# Patient Record
Sex: FEMALE | Race: WHITE | NOT HISPANIC OR LATINO | Employment: OTHER | URBAN - METROPOLITAN AREA
[De-identification: names, ages, dates, MRNs, and addresses within clinical notes are randomized per-mention and may not be internally consistent; named-entity substitution may affect disease eponyms.]

---

## 2018-05-18 ENCOUNTER — PROCEDURE VISIT CONVERTED (OUTPATIENT)
Dept: UROLOGY | Facility: CLINIC | Age: 68
End: 2018-05-18
Attending: UROLOGY

## 2019-05-10 ENCOUNTER — HOSPITAL ENCOUNTER (OUTPATIENT)
Dept: OTHER | Facility: HOSPITAL | Age: 69
Discharge: HOME | End: 2019-05-10
Attending: UROLOGY

## 2019-05-12 LAB — BACTERIA UR CULT: NORMAL

## 2019-05-17 ENCOUNTER — HOSPITAL ENCOUNTER (OUTPATIENT)
Dept: OTHER | Facility: HOSPITAL | Age: 69
Discharge: HOME | End: 2019-05-17
Attending: UROLOGY

## 2019-05-17 ENCOUNTER — PROCEDURE VISIT CONVERTED (OUTPATIENT)
Dept: UROLOGY | Facility: CLINIC | Age: 69
End: 2019-05-17
Attending: UROLOGY

## 2019-05-17 LAB
ANION GAP SERPL CALC-SCNC: 14 MMOL/L (ref 8–19)
BUN SERPL-MCNC: 9 MG/DL (ref 5–25)
BUN/CREAT SERPL: 13 {RATIO} (ref 6–20)
CALCIUM SERPL-MCNC: 9.5 MG/DL (ref 8.7–10.4)
CHLORIDE SERPL-SCNC: 99 MMOL/L (ref 99–111)
CONV CO2: 32 MMOL/L (ref 22–32)
CREAT UR-MCNC: 0.7 MG/DL (ref 0.5–0.9)
GFR SERPLBLD BASED ON 1.73 SQ M-ARVRAT: >60 ML/MIN/{1.73_M2}
GLUCOSE SERPL-MCNC: 94 MG/DL (ref 65–99)
OSMOLALITY SERPL CALC.SUM OF ELEC: 290 MOSM/KG (ref 273–304)
POTASSIUM SERPL-SCNC: 4.2 MMOL/L (ref 3.5–5.3)
SODIUM SERPL-SCNC: 141 MMOL/L (ref 135–147)

## 2019-06-05 ENCOUNTER — HOSPITAL ENCOUNTER (OUTPATIENT)
Dept: CT IMAGING | Facility: HOSPITAL | Age: 69
Discharge: HOME | End: 2019-06-05
Attending: UROLOGY

## 2019-06-21 ENCOUNTER — HOSPITAL ENCOUNTER (OUTPATIENT)
Dept: GENERAL RADIOLOGY | Facility: HOSPITAL | Age: 69
Discharge: HOME | End: 2019-06-21
Attending: UROLOGY

## 2019-06-24 ENCOUNTER — HOSPITAL ENCOUNTER (OUTPATIENT)
Dept: OTHER | Facility: HOSPITAL | Age: 69
Discharge: HOME | End: 2019-06-24
Attending: UROLOGY

## 2019-06-24 ENCOUNTER — OFFICE VISIT CONVERTED (OUTPATIENT)
Dept: UROLOGY | Facility: CLINIC | Age: 69
End: 2019-06-24
Attending: UROLOGY

## 2019-06-24 LAB
ANION GAP SERPL CALC-SCNC: 18 MMOL/L (ref 8–19)
BUN SERPL-MCNC: 9 MG/DL (ref 5–25)
BUN/CREAT SERPL: 12 {RATIO} (ref 6–20)
CALCIUM SERPL-MCNC: 9.4 MG/DL (ref 8.7–10.4)
CHLORIDE SERPL-SCNC: 100 MMOL/L (ref 99–111)
CONV CO2: 30 MMOL/L (ref 22–32)
CREAT UR-MCNC: 0.76 MG/DL (ref 0.5–0.9)
GFR SERPLBLD BASED ON 1.73 SQ M-ARVRAT: >60 ML/MIN/{1.73_M2}
GLUCOSE SERPL-MCNC: 78 MG/DL (ref 65–99)
OSMOLALITY SERPL CALC.SUM OF ELEC: 294 MOSM/KG (ref 273–304)
POTASSIUM SERPL-SCNC: 4.8 MMOL/L (ref 3.5–5.3)
SODIUM SERPL-SCNC: 143 MMOL/L (ref 135–147)

## 2019-07-02 ENCOUNTER — OFFICE VISIT CONVERTED (OUTPATIENT)
Dept: INTERNAL MEDICINE | Facility: CLINIC | Age: 69
End: 2019-07-02
Attending: INTERNAL MEDICINE

## 2019-07-02 ENCOUNTER — HOSPITAL ENCOUNTER (OUTPATIENT)
Dept: OTHER | Facility: HOSPITAL | Age: 69
Discharge: HOME | End: 2019-07-02
Attending: INTERNAL MEDICINE

## 2019-07-02 LAB
ALBUMIN SERPL-MCNC: 4.2 G/DL (ref 3.5–5)
ALBUMIN/GLOB SERPL: 1.4 {RATIO} (ref 1.4–2.6)
ALP SERPL-CCNC: 79 U/L (ref 43–160)
ALT SERPL-CCNC: 16 U/L (ref 10–40)
ANION GAP SERPL CALC-SCNC: 20 MMOL/L (ref 8–19)
AST SERPL-CCNC: 26 U/L (ref 15–50)
BASOPHILS # BLD AUTO: 0.04 10*3/UL (ref 0–0.2)
BASOPHILS NFR BLD AUTO: 0.9 % (ref 0–3)
BILIRUB SERPL-MCNC: 1.04 MG/DL (ref 0.2–1.3)
BNP SERPL-MCNC: 198 PG/ML (ref 0–900)
BUN SERPL-MCNC: 13 MG/DL (ref 5–25)
BUN/CREAT SERPL: 17 {RATIO} (ref 6–20)
CALCIUM SERPL-MCNC: 10.4 MG/DL (ref 8.7–10.4)
CHLORIDE SERPL-SCNC: 94 MMOL/L (ref 99–111)
CHOLEST SERPL-MCNC: 244 MG/DL (ref 107–200)
CHOLEST/HDLC SERPL: 1.7 {RATIO} (ref 3–6)
CONV ABS IMM GRAN: 0 10*3/UL (ref 0–0.2)
CONV CO2: 30 MMOL/L (ref 22–32)
CONV IMMATURE GRAN: 0 % (ref 0–1.8)
CONV TOTAL PROTEIN: 7.3 G/DL (ref 6.3–8.2)
CREAT UR-MCNC: 0.77 MG/DL (ref 0.5–0.9)
DEPRECATED RDW RBC AUTO: 56.9 FL (ref 36.4–46.3)
EOSINOPHIL # BLD AUTO: 0.03 10*3/UL (ref 0–0.7)
EOSINOPHIL # BLD AUTO: 0.7 % (ref 0–7)
ERYTHROCYTE [DISTWIDTH] IN BLOOD BY AUTOMATED COUNT: 13.5 % (ref 11.7–14.4)
GFR SERPLBLD BASED ON 1.73 SQ M-ARVRAT: >60 ML/MIN/{1.73_M2}
GLOBULIN UR ELPH-MCNC: 3.1 G/DL (ref 2–3.5)
GLUCOSE SERPL-MCNC: 97 MG/DL (ref 65–99)
HBA1C MFR BLD: 18.4 G/DL (ref 12–16)
HCT VFR BLD AUTO: 54.2 % (ref 37–47)
HDLC SERPL-MCNC: 142 MG/DL (ref 40–60)
LDLC SERPL CALC-MCNC: 89 MG/DL (ref 70–100)
LYMPHOCYTES # BLD AUTO: 1.37 10*3/UL (ref 1–5)
MCH RBC QN AUTO: 37.9 PG (ref 27–31)
MCHC RBC AUTO-ENTMCNC: 33.9 G/DL (ref 33–37)
MCV RBC AUTO: 111.5 FL (ref 81–99)
MONOCYTES # BLD AUTO: 0.5 10*3/UL (ref 0.2–1.2)
MONOCYTES NFR BLD AUTO: 10.8 % (ref 3–10)
NEUTROPHILS # BLD AUTO: 2.67 10*3/UL (ref 2–8)
NEUTROPHILS NFR BLD AUTO: 57.9 % (ref 30–85)
NRBC CBCN: 0 % (ref 0–0.7)
OSMOLALITY SERPL CALC.SUM OF ELEC: 288 MOSM/KG (ref 273–304)
PLATELET # BLD AUTO: 201 10*3/UL (ref 130–400)
PMV BLD AUTO: 11.8 FL (ref 9.4–12.3)
POTASSIUM SERPL-SCNC: 4.8 MMOL/L (ref 3.5–5.3)
RBC # BLD AUTO: 4.86 10*6/UL (ref 4.2–5.4)
SODIUM SERPL-SCNC: 139 MMOL/L (ref 135–147)
T4 FREE SERPL-MCNC: 1.4 NG/DL (ref 0.9–1.8)
TRIGL SERPL-MCNC: 64 MG/DL (ref 40–150)
TSH SERPL-ACNC: 2 M[IU]/L (ref 0.27–4.2)
VARIANT LYMPHS NFR BLD MANUAL: 29.7 % (ref 20–45)
VLDLC SERPL-MCNC: 13 MG/DL (ref 5–37)
WBC # BLD AUTO: 4.61 10*3/UL (ref 4.8–10.8)

## 2019-07-05 LAB — CONV ANTI NUCLEAR ANTIBODY WITH REFLEX: NEGATIVE

## 2019-07-23 ENCOUNTER — HOSPITAL ENCOUNTER (OUTPATIENT)
Dept: ULTRASOUND IMAGING | Facility: HOSPITAL | Age: 69
Discharge: HOME | End: 2019-07-23
Attending: UROLOGY

## 2019-07-26 ENCOUNTER — HOSPITAL ENCOUNTER (OUTPATIENT)
Dept: SURGERY | Facility: CLINIC | Age: 69
Discharge: HOME | End: 2019-07-26
Attending: UROLOGY

## 2019-07-26 ENCOUNTER — OFFICE VISIT CONVERTED (OUTPATIENT)
Dept: UROLOGY | Facility: CLINIC | Age: 69
End: 2019-07-26
Attending: UROLOGY

## 2019-07-28 LAB — BACTERIA UR CULT: NORMAL

## 2019-07-31 ENCOUNTER — CONVERSION ENCOUNTER (OUTPATIENT)
Dept: INTERNAL MEDICINE | Facility: CLINIC | Age: 69
End: 2019-07-31

## 2019-08-07 ENCOUNTER — HOSPITAL ENCOUNTER (OUTPATIENT)
Dept: CT IMAGING | Facility: HOSPITAL | Age: 69
Discharge: HOME | End: 2019-08-07
Attending: UROLOGY

## 2019-08-07 LAB
CREAT BLD-MCNC: 0.6 MG/DL (ref 0.6–1.4)
GFR SERPLBLD BASED ON 1.73 SQ M-ARVRAT: >60 ML/MIN/{1.73_M2}

## 2019-08-08 ENCOUNTER — HOSPITAL ENCOUNTER (OUTPATIENT)
Dept: PREADMISSION TESTING | Facility: HOSPITAL | Age: 69
Discharge: HOME | End: 2019-08-08
Attending: UROLOGY

## 2019-08-08 LAB
BASOPHILS # BLD AUTO: 0.05 10*3/UL (ref 0–0.2)
BASOPHILS NFR BLD AUTO: 1.1 % (ref 0–3)
CONV ABS IMM GRAN: 0 10*3/UL (ref 0–0.2)
CONV IMMATURE GRAN: 0 % (ref 0–1.8)
DEPRECATED RDW RBC AUTO: 52.1 FL (ref 36.4–46.3)
EOSINOPHIL # BLD AUTO: 0.05 10*3/UL (ref 0–0.7)
EOSINOPHIL # BLD AUTO: 1.1 % (ref 0–7)
ERYTHROCYTE [DISTWIDTH] IN BLOOD BY AUTOMATED COUNT: 12.8 % (ref 11.7–14.4)
HCT VFR BLD AUTO: 53.8 % (ref 37–47)
HGB BLD-MCNC: 18.1 G/DL (ref 12–16)
LYMPHOCYTES # BLD AUTO: 1.41 10*3/UL (ref 1–5)
LYMPHOCYTES NFR BLD AUTO: 31.5 % (ref 20–45)
MCH RBC QN AUTO: 36.6 PG (ref 27–31)
MCHC RBC AUTO-ENTMCNC: 33.6 G/DL (ref 33–37)
MCV RBC AUTO: 108.9 FL (ref 81–99)
MONOCYTES # BLD AUTO: 0.5 10*3/UL (ref 0.2–1.2)
MONOCYTES NFR BLD AUTO: 11.2 % (ref 3–10)
NEUTROPHILS # BLD AUTO: 2.46 10*3/UL (ref 2–8)
NEUTROPHILS NFR BLD AUTO: 55.1 % (ref 30–85)
NRBC CBCN: 0 % (ref 0–0.7)
PLATELET # BLD AUTO: 245 10*3/UL (ref 130–400)
PMV BLD AUTO: 10.8 FL (ref 9.4–12.3)
RBC # BLD AUTO: 4.94 10*6/UL (ref 4.2–5.4)
WBC # BLD AUTO: 4.47 10*3/UL (ref 4.8–10.8)

## 2019-08-13 ENCOUNTER — HOSPITAL ENCOUNTER (OUTPATIENT)
Dept: PERIOP | Facility: HOSPITAL | Age: 69
Setting detail: HOSPITAL OUTPATIENT SURGERY
Discharge: HOME | End: 2019-08-13
Attending: UROLOGY

## 2019-08-19 ENCOUNTER — OFFICE VISIT CONVERTED (OUTPATIENT)
Dept: INTERNAL MEDICINE | Facility: CLINIC | Age: 69
End: 2019-08-19
Attending: NURSE PRACTITIONER

## 2019-08-20 LAB
COLOR STONE: NORMAL
COMPN STONE: NORMAL
CONV CA OXALATE DIHYDRATE: 5 %
CONV CA OXALATE MONOHYDRATE: 92 %
CONV CALCIUM PHOSPHATE: 3 %
CONV CALCULI COMMENT: NORMAL
CONV CALCULI COMMENT: NORMAL
CONV CALCULI DISCLAIMER: NORMAL
CONV CALCULI NOTE: NORMAL
NIDUS STONE QL: NORMAL
SIZE STONE: NORMAL MM
WT STONE: 7.3 MG

## 2019-09-16 ENCOUNTER — HOSPITAL ENCOUNTER (OUTPATIENT)
Dept: ULTRASOUND IMAGING | Facility: HOSPITAL | Age: 69
Discharge: HOME | End: 2019-09-16
Attending: UROLOGY

## 2019-09-23 ENCOUNTER — OFFICE VISIT CONVERTED (OUTPATIENT)
Dept: UROLOGY | Facility: CLINIC | Age: 69
End: 2019-09-23
Attending: UROLOGY

## 2020-08-21 ENCOUNTER — PROCEDURE VISIT CONVERTED (OUTPATIENT)
Dept: UROLOGY | Facility: CLINIC | Age: 70
End: 2020-08-21
Attending: UROLOGY

## 2020-08-21 ENCOUNTER — HOSPITAL ENCOUNTER (OUTPATIENT)
Dept: SURGERY | Facility: CLINIC | Age: 70
Discharge: HOME | End: 2020-08-21
Attending: UROLOGY

## 2021-04-28 ENCOUNTER — OFFICE VISIT CONVERTED (OUTPATIENT)
Dept: INTERNAL MEDICINE | Facility: CLINIC | Age: 71
End: 2021-04-28
Attending: PHYSICIAN ASSISTANT

## 2021-04-28 ENCOUNTER — HOSPITAL ENCOUNTER (OUTPATIENT)
Dept: OTHER | Facility: HOSPITAL | Age: 71
Discharge: HOME | End: 2021-04-28
Attending: PHYSICIAN ASSISTANT

## 2021-04-28 LAB
ALBUMIN SERPL-MCNC: 4.1 G/DL (ref 3.5–5)
ALBUMIN/GLOB SERPL: 1.4 {RATIO} (ref 1.4–2.6)
ALP SERPL-CCNC: 71 U/L (ref 43–160)
ALT SERPL-CCNC: 36 U/L (ref 10–40)
ANION GAP SERPL CALC-SCNC: 17 MMOL/L (ref 8–19)
AST SERPL-CCNC: 59 U/L (ref 15–50)
BASOPHILS # BLD AUTO: 0.05 10*3/UL (ref 0–0.2)
BASOPHILS NFR BLD AUTO: 1.2 % (ref 0–3)
BILIRUB SERPL-MCNC: 0.84 MG/DL (ref 0.2–1.3)
BNP SERPL-MCNC: 132 PG/ML (ref 0–900)
BUN SERPL-MCNC: 12 MG/DL (ref 5–25)
BUN/CREAT SERPL: 17 {RATIO} (ref 6–20)
CALCIUM SERPL-MCNC: 9.6 MG/DL (ref 8.7–10.4)
CHLORIDE SERPL-SCNC: 98 MMOL/L (ref 99–111)
CHOLEST SERPL-MCNC: 263 MG/DL (ref 107–200)
CHOLEST/HDLC SERPL: 1.6 {RATIO} (ref 3–6)
CONV ABS IMM GRAN: 0 10*3/UL (ref 0–0.2)
CONV CO2: 27 MMOL/L (ref 22–32)
CONV CREATININE URINE, RANDOM: 98.6 MG/DL (ref 10–300)
CONV IMMATURE GRAN: 0 % (ref 0–1.8)
CONV MICROALBUM.,U,RANDOM: 16.6 MG/L (ref 0–20)
CONV TOTAL PROTEIN: 7.1 G/DL (ref 6.3–8.2)
CREAT UR-MCNC: 0.71 MG/DL (ref 0.5–0.9)
DEPRECATED RDW RBC AUTO: 55.6 FL (ref 36.4–46.3)
EOSINOPHIL # BLD AUTO: 0.06 10*3/UL (ref 0–0.7)
EOSINOPHIL # BLD AUTO: 1.4 % (ref 0–7)
ERYTHROCYTE [DISTWIDTH] IN BLOOD BY AUTOMATED COUNT: 13.4 % (ref 11.7–14.4)
GFR SERPLBLD BASED ON 1.73 SQ M-ARVRAT: >60 ML/MIN/{1.73_M2}
GLOBULIN UR ELPH-MCNC: 3 G/DL (ref 2–3.5)
GLUCOSE SERPL-MCNC: 74 MG/DL (ref 65–99)
HCT VFR BLD AUTO: 49.6 % (ref 37–47)
HDLC SERPL-MCNC: 160 MG/DL (ref 40–60)
HGB BLD-MCNC: 16.9 G/DL (ref 12–16)
LDLC SERPL CALC-MCNC: 87 MG/DL (ref 70–100)
LYMPHOCYTES # BLD AUTO: 1.23 10*3/UL (ref 1–5)
LYMPHOCYTES NFR BLD AUTO: 28.7 % (ref 20–45)
MCH RBC QN AUTO: 37.5 PG (ref 27–31)
MCHC RBC AUTO-ENTMCNC: 34.1 G/DL (ref 33–37)
MCV RBC AUTO: 110 FL (ref 81–99)
MICROALBUMIN/CREAT UR: 16.8 MG/G{CRE} (ref 0–35)
MONOCYTES # BLD AUTO: 0.49 10*3/UL (ref 0.2–1.2)
MONOCYTES NFR BLD AUTO: 11.4 % (ref 3–10)
NEUTROPHILS # BLD AUTO: 2.45 10*3/UL (ref 2–8)
NEUTROPHILS NFR BLD AUTO: 57.3 % (ref 30–85)
NRBC CBCN: 0 % (ref 0–0.7)
OSMOLALITY SERPL CALC.SUM OF ELEC: 284 MOSM/KG (ref 273–304)
PLATELET # BLD AUTO: 180 10*3/UL (ref 130–400)
PMV BLD AUTO: 12.5 FL (ref 9.4–12.3)
POTASSIUM SERPL-SCNC: 4.4 MMOL/L (ref 3.5–5.3)
RBC # BLD AUTO: 4.51 10*6/UL (ref 4.2–5.4)
SODIUM SERPL-SCNC: 138 MMOL/L (ref 135–147)
TRIGL SERPL-MCNC: 82 MG/DL (ref 40–150)
TSH SERPL-ACNC: 3.16 M[IU]/L (ref 0.27–4.2)
VLDLC SERPL-MCNC: 16 MG/DL (ref 5–37)
WBC # BLD AUTO: 4.28 10*3/UL (ref 4.8–10.8)

## 2021-05-10 NOTE — PROCEDURES
Procedure Note      Patient Name: Queenie Pike   Patient ID: [id number]   Sex: Female   YOB: 1950    Primary Care Provider: Ailin Felton MD   Referring Provider: Ailin Felton MD    Visit Date: August 21, 2020    Provider: Edward Cobb MD   Location: [facility]   Location Address: [address] [place] [address]  [id number]   Location Phone: [phone]          Cystoscopy Procedure:  PROCEDURE: Flexible cystoscope was passed per urethra into the bladder without difficulty after proper consent. The bladder was inspected in a systematic meridian fashion. There were no tumors, lesions, stones, or other abnormalities noted within the bladder. Of note, there was no increased vascularity as well. Both ureteral orifices were identified and were normal in appearance. The flexible cystoscope was removed. The patient tolerated the procedure well.   The patients urine was viewe d under a microscope during his clinical visit: no RBC present, no WBC present, no Bacteria present.      Patient bladder with some mild trabeculations, no signs of recurrent    TCC history    5/19 cystoscopycytologynegative  12/14 CT urogram4.3 x 3.8 cm myelolipoma in the right adrenal gland.  Nonobstructing calculus in lower pole the right kidney measuring 8 mm.  Nonobstructing 2 to 3 mm calculus in the midpole left kidney.  9/14 cystoscopynegative  5/13 cystoscopynegative  2008BCG treatments  12/07 TURBTseveral tumors the bladder neck at the 12 o'clock position and also the 3 and 4 o'clock position and also on the right floor  Path noninvasive papillary. TCC, high-grade    patient is also had bladder tumor removed 7 years before this approximately           Assessment  · History of bladder cancer     V10.51/Z85.51  · Nephrolithiasis     592.0/N20.0      Plan  · Orders  o KUB xray [facility] Preferred View (01129) - 592.0/N20.0 - 08/21/2021  o Urine cytology (17749) - V10.51/Z85.51, 592.0/N20.0 - 08/21/2020  o Cystoscopy  (15076) - V10.51/Z85.51, 592.0/N20.0 - 08/21/2020  · Medications  o Medications have been Reconciled  o Transition of Care or Provider Policy  · Instructions  o Electronically Identified Patient Education Materials Provided Electronically     Discussed risk and benefits of moving forward with further cystoscopy for surveillance.   has been greater than 10 years since recurrence so next year we will stop further cystoscopy and do UA with micro and cytology    Patient again counseled to quit smoking.    KUB at follow-up             Electronically Signed by: Edward Cobb MD -Author on August 21, 2020 11:58:48 AM

## 2021-05-12 ENCOUNTER — OFFICE VISIT CONVERTED (OUTPATIENT)
Dept: INTERNAL MEDICINE | Facility: CLINIC | Age: 71
End: 2021-05-12
Attending: PHYSICIAN ASSISTANT

## 2021-05-14 VITALS
RESPIRATION RATE: 15 BRPM | HEIGHT: 59 IN | OXYGEN SATURATION: 93 % | SYSTOLIC BLOOD PRESSURE: 168 MMHG | TEMPERATURE: 98.3 F | DIASTOLIC BLOOD PRESSURE: 92 MMHG | BODY MASS INDEX: 25.65 KG/M2 | WEIGHT: 127.25 LBS | HEART RATE: 88 BPM

## 2021-05-14 NOTE — PROGRESS NOTES
Progress Note      Patient Name: Queenie Pike   Patient ID: [id number]   Sex: Female   YOB: 1950    Primary Care Provider: Ailin Felton MD   Referring Provider: Ailin Felton MD    Visit Date: April 28, 2021    Provider: Coty Donovan PA-C   Location: [facility] [place]   Location Address: [address] [place] [address], [address]  [place], [place]  [id number]   Location Phone: [phone]          Chief Complaint  · feet swelling       History Of Present Illness  Queenie Pike is a 70 year old /White female who presents for evaluation and treatment of:      f/u- has not been seen in office since 8/19/2019    Feet swelling-   She tried Lasix last yr but only had one pill and did not notice improvement  Swelling mostly in feet and ankles. Denies swelling in calf.   denies numbness/tingling in legs. At times legs are red from the swelling. Sometimes feel warm from the swelling. Denies fever.  Denies sob, wheezing, rep distress, sob at night, wgt gain    BP elevated- States she feels really anxious.  Denies cp.  Heart no longer racing.   Denies unilateral weakness, slurred speech, confusion, vision loss or blurry vision.     Anxiety- does not want medicine   Feels very anxious often.  Denies feeling down, sad  Denies si/hi         Past Medical History  Disease Name Date Onset Notes   Allergic rhinitis --  --    Anemia --  --    Anxiety --  --    Bilateral Pulmonary nodule/lesion 12/22/2014 will repeat CT scan in March   Bladder Cancer 2009 --    Bladder disorder --  --    Broken Bones 1987 --    COPD, severe 3/13/15 she refuses inhalers other than albuterol, but got her to take symbicort sample and told her would prescribe it if she likes it also told her we could do nocturnal oxygen testing if she is interested, however states she doesn't want to at this time   Depression --  --    Head injury 1987 --    Hyperlipemia --  --    Jaundice 1987 --    Macrocytosis 01/12/2016 --    Pulmonary embolus  12/15/2015 Doing well off of medication Continue to monitor Warned her personal risk especially if she continues to smoke however she really wants to be off of medicines and understands the risks Doing well off of medication Continue to monitor Warned her personal risk especially if she continues to smoke however she really wants to be off of medicines and understands the risks   Tobacco abuse 2014 she isn't interested in tobacco cessation at this time         Past Surgical History  Procedure Name Date Notes   Bladder Surg.  --    Cystoscopy and ureteroscopy with laser lithotripsy 19 --    Hysterectomy  --    Salpingo-oophorectomy 2015 bilateral for removal of large cystadenomas (largest 22cm)   Tumor removal --  ovaries   Ureter stent placement 19 --          Medication List  Name Date Started Instructions   aspirin 81 mg oral tablet,delayed release (DR/EC)  take 1 tablet (81 mg) by oral route once daily   B12 1 ,000mg  --    Fish Oil 1,000 mg (120 mg-180 mg) oral capsule  take 1 capsule by oral route daily         Allergy List  Allergen Name Date Reaction Notes   Metals --  --  --    PENICILLINS --  SOB --    Perfumes --  SOB --    SULFA (SULFONAMIDES) --  SOB --    TETANUS --  Forgetfulness --        Allergies Reconciled  Family Medical History  Disease Name Relative/Age Notes   Lung cancer Mother/   --    Diabetes Sister/   --          Reproductive History  Menstrual   Menopause Status: Postmenopausal   Pregnancy Summary   Total Pregnancies: 3 Full Term: 3 Premature: 0   Ab Induced: 0 Ab Spontaneous: 0 Ectopics: 0   Multiples: 0 Livin         Social History  Finding Status Start/Stop Quantity Notes   Alcohol Current every day --/-- --  7 beverages weekly   Tobacco Current every day --/-- 1 Pk QD has smoked over 20yrs         Vitals  Date Time BP Position Site L\R Cuff Size HR RR TEMP (F) WT  HT  BMI kg/m2 BSA m2 O2 Sat FR L/min FiO2 HC       2019 04:55 /75 Sitting    92  "- R 16 98.3 126lbs 4oz 4'  11\" 25.5 1.54 94 %  21%    09/23/2019 11:20 /80 Sitting       124lbs 0oz 4'  11\" 25.04 1.53       04/28/2021 09:20 /92 Sitting    88 - R 15 98.3 127lbs 4oz 4'  11\" 25.7 1.55 93 %            Physical Examination  · Constitutional  o Appearance  o : no acute distress, well-nourished  · Head and Face  o Head  o :   § Inspection  § : atraumatic, normocephalic  · Eyes  o Eyes  o : extraocular movements intact, no scleral icterus, no conjunctival injection  · Ears, Nose, Mouth and Throat  o Ears  o :   § External Ears  § : normal  § Otoscopic Examination  § : tympanic membrane appearance within normal limits bilaterally  o Nose  o :   § Intranasal Exam  § : nares patent  o Oral Cavity  o :   § Oral Mucosa  § : moist mucous membranes  o Throat  o :   § Oropharynx  § : no inflammation or lesions present, tonsils within normal limits  · Neck  o Thyroid  o : gland size normal, nontender, no nodules or masses present on palpation, symmetric  · Respiratory  o Respiratory Effort  o : breathing comfortably, symmetric chest rise  o Auscultation of Lungs  o : Decreased bilateral base lung sounds, clear  · Cardiovascular  o Heart  o :   § Auscultation of Heart  § : regular rate and rhythm, no murmurs, rubs, or gallops  o Peripheral Vascular System  o :   § Extremities  § : non pitting edema in bilateral legs, redness, no warmth, dry flaky skin  · Gastrointestinal  o Abdominal Examination  o :   § Abdomen  § : bowel sounds present, non-distended, non-tender  · Lymphatic  o Neck  o : no lymphadenopathy present  o Supraclavicular Nodes  o : no supraclavicular nodes  · Skin and Subcutaneous Tissue  o General Inspection  o : toe nails yellow, thick, and cracking on bilateral feet  · Neurologic  o Mental Status Examination  o :   § Orientation  § : grossly oriented to person, place and time  o Gait and Station  o :   § Gait Screening  § : normal gait  · Psychiatric  o General  o : normal mood and " affect              Assessment  · Essential hypertension     401.9/I10  Discussed bp elevation at today's visit. Discussed risks of bp elevation including death, mi, stroke, ckd, blindness. Low salt diet, increase exercise. Discussed importance of medication compliance and not missing doses. Started patient on Lisinopril 10mg daily. Educated her on changing positions slowly to prevent dizziness, possible side effect of a cough, and angioedema. If she experiences any facial or oral swelling she was instructed to discontinue the medication immediately and call the office or go to ER. Encouraged patient to monitor blood pressure at home. We will monitor at follow up and adjust bp medications if necessary. To er if cp, palpitations, vision loss, unilateral weakness, altered mental status. Pt understands and agrees with plan.   · Hyperlipemia     272.4/E78.5  Drawing lipid panel in office.  · Bladder Cancer     188.8  Reviewed note from 2019 from Dr. Cobb. Discussed need to follow up for annual check up with their office.  · Anxiety     300.02/F41.1  Discussed anxiety with pt and medication and counseling tx options. Pt declined tx at this time.  · Lower extremity edema     782.3/R60.0  Drawing BNP in office today to assess for heart failure. Ordered heart echo to assess function of heart and possibility for heart failure. Started patient on Lisinopril for uncontrolled hypertension. Encouraged patient to utilize compression stockings or tall socks and to elevate her feet in a recliner whenever possible. She was also instructed to restrict salt in her diet. To er if cp, jaw/shoulder pain, sob. Pt educated DVT sx with hx of PE, no concerning findings of DVT on exam today, no need for US of LE today.      Plan  · Orders  o Physical, Primary Care Panel (CBC, CMP, Lipid, TSH) [facility] (00166, 48826, 11335, 59339) - 782.3/R60.0, 272.4/E78.5, 188.8, 401.9/I10 - 04/28/2021  o ACO-39: Current medications updated and reviewed  (, 1159F) - - 04/28/2021  o BNP (brain natriuretic peptide measurement) (05601) - 782.3/R60.0, 272.4/E78.5, 188.8, 401.9/I10 - 04/28/2021  o Echocardiogram - [facility] (85572, 27370, 22379) - 782.3/R60.0, 272.4/E78.5, 188.8, 401.9/I10 - 04/28/2021  o Urine microalbumin (68291) - 782.3/R60.0, 188.8, 401.9/I10 - 04/28/2021  · Medications  o lisinopril 10 mg oral tablet   SIG: take 1 tablet (10 mg) by oral route once daily   DISP: (30) Tablet with 0 refills  Prescribed on 04/28/2021     o Medications have been Reconciled  o Transition of Care or Provider Policy  · Instructions  o Patient advised to monitor blood pressure (B/P) at home and journal readings. Patient informed that a B/P reading at home of more than 130/80 is considered hypertension. For readings greater cuze884/90 or higher patient is advised to follow up in the office with readings for management. Patient advised to limit sodium intake.  o Handouts were given to patient: HTN  o Patient was educated/instructed on their diagnosis, treatment and medications prior to discharge from the clinic today.  o Electronically Identified Patient Education Materials Provided Electronically  · Disposition  o Call or Return if symptoms worsen or persist.  o Follow up in 2 weeks  o Labs drawn in office today  o Order placed for imaging            Electronically Signed by: Coty Donovan PA-C -Author on April 28, 2021 02:08:24 PM

## 2021-05-15 VITALS — DIASTOLIC BLOOD PRESSURE: 78 MMHG | SYSTOLIC BLOOD PRESSURE: 140 MMHG

## 2021-05-15 VITALS
SYSTOLIC BLOOD PRESSURE: 142 MMHG | HEART RATE: 100 BPM | WEIGHT: 123.25 LBS | BODY MASS INDEX: 24.85 KG/M2 | DIASTOLIC BLOOD PRESSURE: 88 MMHG | HEIGHT: 59 IN | OXYGEN SATURATION: 94 % | TEMPERATURE: 97.8 F

## 2021-05-15 VITALS — BODY MASS INDEX: 25.22 KG/M2 | WEIGHT: 125.12 LBS | HEIGHT: 59 IN | RESPIRATION RATE: 17 BRPM

## 2021-05-15 VITALS
SYSTOLIC BLOOD PRESSURE: 164 MMHG | DIASTOLIC BLOOD PRESSURE: 80 MMHG | HEIGHT: 59 IN | WEIGHT: 124 LBS | BODY MASS INDEX: 25 KG/M2

## 2021-05-15 VITALS — BODY MASS INDEX: 25.05 KG/M2 | WEIGHT: 124.25 LBS | RESPIRATION RATE: 16 BRPM | HEIGHT: 59 IN

## 2021-05-15 VITALS
WEIGHT: 126.25 LBS | DIASTOLIC BLOOD PRESSURE: 75 MMHG | TEMPERATURE: 98.3 F | HEIGHT: 59 IN | SYSTOLIC BLOOD PRESSURE: 142 MMHG | RESPIRATION RATE: 16 BRPM | HEART RATE: 92 BPM | BODY MASS INDEX: 25.45 KG/M2 | OXYGEN SATURATION: 94 %

## 2021-05-15 VITALS — RESPIRATION RATE: 14 BRPM | WEIGHT: 116 LBS | HEIGHT: 59 IN | BODY MASS INDEX: 23.39 KG/M2

## 2021-05-16 VITALS — WEIGHT: 116 LBS | BODY MASS INDEX: 23.39 KG/M2 | HEIGHT: 59 IN | RESPIRATION RATE: 14 BRPM

## 2021-06-05 NOTE — PROGRESS NOTES
Progress Note      Patient Name: Queenie Pike   Patient ID: [id number]   Sex: Female   YOB: 1950    Primary Care Provider: Ailin Felton MD   Referring Provider: Ailin Felton MD    Visit Date: May 12, 2021    Provider: Coty Donovan PA-C   Location: [facility]   Location Address: [address] [place] [address], [address]  [place], [place]  [id number]   Location Phone: [phone]          Chief Complaint  · f/u HTN. HLD, Anxiety      History Of Present Illness  Queenie Pike is a 70 year old /White female who presents for evaluation and treatment of:      f/u     COPD- does not want to quit smoking    Anxiety- has had a lot of stress at home.   Declines tx for anxiety at this time    feet swelling- was not able to get compression socks yet  Denies excess salt intake  Denies cp, palpitations, jaw/shoulder pain  Denies pain in feet    HLD- restarted fish oil    HTN- has been taking Lisinopril but wants to change bp medication because her  had a reaction to lisinopril causing a ICU stay  Lisinopril makes her too nervous to take  BP at home since starting has been 120-140/90s       Past Medical History  Disease Name Date Onset Notes   Allergic rhinitis --  --    Anemia --  --    Anxiety --  --    Bilateral Pulmonary nodule/lesion 12/22/2014 will repeat CT scan in March   Bladder Cancer 2009 --    Bladder disorder --  --    Broken Bones 1987 --    COPD, severe 3/13/15 she refuses inhalers other than albuterol, but got her to take symbicort sample and told her would prescribe it if she likes it also told her we could do nocturnal oxygen testing if she is interested, however states she doesn't want to at this time   Depression --  --    Head injury 1987 --    Hyperlipemia --  --    Jaundice 1987 --    Macrocytosis 01/12/2016 --    Pulmonary embolus 12/15/2015 Doing well off of medication Continue to monitor Warned her personal risk especially if she continues to smoke however she  "really wants to be off of medicines and understands the risks Doing well off of medication Continue to monitor Warned her personal risk especially if she continues to smoke however she really wants to be off of medicines and understands the risks   Tobacco abuse 2014 she isn't interested in tobacco cessation at this time         Past Surgical History  Procedure Name Date Notes   Bladder Surg.  --    Cystoscopy and ureteroscopy with laser lithotripsy 19 --    Hysterectomy  --    Salpingo-oophorectomy 2015 bilateral for removal of large cystadenomas (largest 22cm)   Tumor removal --  ovaries   Ureter stent placement 19 --          Medication List  Name Date Started Instructions   aspirin 81 mg oral tablet,delayed release (DR/EC)  take 1 tablet (81 mg) by oral route once daily   B12 1 ,000mg  --    Blood Pressure Kit miscellaneous kit 2021 use as directed   Fish Oil 1,000 mg (120 mg-180 mg) oral capsule  take 1 capsule by oral route daily         Allergy List  Allergen Name Date Reaction Notes   Metals --  --  --    PENICILLINS --  SOB --    Perfumes --  SOB --    SULFA (SULFONAMIDES) --  SOB --    TETANUS --  Forgetfulness --        Allergies Reconciled  Family Medical History  Disease Name Relative/Age Notes   Lung cancer Mother/   --    Diabetes Sister/   --          Reproductive History  Menstrual   Menopause Status: Postmenopausal   Pregnancy Summary   Total Pregnancies: 3 Full Term: 3 Premature: 0   Ab Induced: 0 Ab Spontaneous: 0 Ectopics: 0   Multiples: 0 Livin         Social History  Finding Status Start/Stop Quantity Notes   Alcohol Current every day --/-- --  7 beverages weekly   Tobacco Current every day --/-- 1 Pk QD has smoked over 20yrs         Vitals  Date Time BP Position Site L\R Cuff Size HR RR TEMP (F) WT  HT  BMI kg/m2 BSA m2 O2 Sat FR L/min FiO2 HC       2019 04:55 /75 Sitting    92 - R 16 98.3 126lbs 4oz 4'  11\" 25.5 1.54 94 %  21%    2021 " "09:20 /92 Sitting    88 - R 15 98.3 127lbs 4oz 4'  11\" 25.7 1.55 93 %      05/12/2021 10:26 /82 Sitting    82 - R 15 97.9 130lbs 8oz 4'  11\" 26.36 1.57 94 %            Physical Examination  · Constitutional  o Appearance  o : no acute distress, well-nourished  · Head and Face  o Head  o :   § Inspection  § : atraumatic, normocephalic  · Eyes  o Eyes  o : extraocular movements intact, no scleral icterus, no conjunctival injection  · Ears, Nose, Mouth and Throat  o Ears  o :   § External Ears  § : normal  o Nose  o :   § Intranasal Exam  § : nares patent  o Oral Cavity  o :   § Oral Mucosa  § : moist mucous membranes  · Respiratory  o Respiratory Effort  o : breathing comfortably, symmetric chest rise  o Auscultation of Lungs  o : clear to asculatation bilaterally, no wheezes, rales, or rhonchii  · Cardiovascular  o Heart  o :   § Auscultation of Heart  § : regular rate and rhythm, no murmurs, rubs, or gallops  o Peripheral Vascular System  o :   § Extremities  § : no edema  · Skin and Subcutaneous Tissue  o General Inspection  o : no lesions present, no areas of discoloration, skin turgor normal  · Neurologic  o Mental Status Examination  o :   § Orientation  § : grossly oriented to person, place and time  o Gait and Station  o :   § Gait Screening  § : normal gait  · Psychiatric  o General  o : normal mood and affect          Assessment  · Essential hypertension     401.9/I10  Discussed bp elevation at today's visit. Discussed risks of bp elevation including death, mi, stroke, ckd, blindness. Low salt diet, increase exercise. Discussed importance of medication compliance and not missing doses. d/c lisinopril due to pt concerns, will start Losartan 50mg- 1/2 tab daily. Pt will monitor bp at home. We will monitor at follow up and adjust bp medications if necessary. To er if cp, palpitations, vision loss, unilateral weakness, altered mental status. Pt understands and agrees with plan.  · Nicotine " dependence     305.1/F17.200  Discussed risks of smoking with patient including death, bp elevation, mi, stroke, CKD, blindness, slow wound healing. Pt is not ready to quit smoking at this time, encouraged to RTC once ready to quit.  · COPD, severe     496/J44.9  she refuses inhalers other than albuterol, encouraged smoking cessation   · Hyperlipemia     272.4/E78.5  discussed elevated chol. Pt declined medication. She will watch diet and restarted fish oil  · Anxiety     300.02/F41.1  declines tx at this time  · Lower extremity edema     782.3/R60.0  reviewed recent labs. Discussed normal bnp, pt scheduled for echo. Discussed swelling not likely from CHF and so I do not rec restarting Lasix due to abnormal kidney function. Encouraged low salt diet, increase exercise, wearing compression socks. Pt understands and agrees  · CKD (chronic kidney disease)     585.9/N18.9  Discussed ckd likely due to uncontrolled bp. Encouraged bp mediation compliance, increase water intake, limit nsaids. will monitor on next labs      Plan  · Orders  o ACO-17: Screened for tobacco use AND received tobacco cessation intervention (4004F) - 305.1/F17.200 - 05/12/2021  o ACO-39: Current medications updated and reviewed (, 1159F) - - 05/12/2021  · Medications  o losartan 50 mg oral tablet   SIG: take 1 tablet (50 mg) by oral route once daily for 30 days   DISP: (30) Tablet with 1 refills  Prescribed on 05/12/2021     o lisinopril 10 mg oral tablet   SIG: take 1 tablet (10 mg) by oral route once daily   DISP: (30) Tablet with 0 refills  Discontinued on 05/12/2021     o Medications have been Reconciled  o Transition of Care or Provider Policy  · Instructions  o Patient advised to monitor blood pressure (B/P) at home and journal readings. Patient informed that a B/P reading at home of more than 130/80 is considered hypertension. For readings greater vogh062/90 or higher patient is advised to follow up in the office with readings for  management. Patient advised to limit sodium intake.  o *Form of nicotine being used: cigarette  o Patient was strongly encouraged to discontinue use of any nicotine containing product or minimize the use of the product.  o Patient was educated/instructed on their diagnosis, treatment and medications prior to discharge from the clinic today.  · Disposition  o Call or Return if symptoms worsen or persist.  o Follow up in 6 weeks            Electronically Signed by: Coty Donovan PA-C -Author on May 12, 2021 02:49:14 PM

## 2021-06-23 ENCOUNTER — OFFICE VISIT (OUTPATIENT)
Dept: INTERNAL MEDICINE | Facility: CLINIC | Age: 71
End: 2021-06-23

## 2021-06-23 VITALS
HEIGHT: 59 IN | RESPIRATION RATE: 15 BRPM | BODY MASS INDEX: 25.4 KG/M2 | DIASTOLIC BLOOD PRESSURE: 80 MMHG | HEART RATE: 86 BPM | WEIGHT: 126 LBS | SYSTOLIC BLOOD PRESSURE: 130 MMHG | TEMPERATURE: 97.7 F | OXYGEN SATURATION: 94 %

## 2021-06-23 DIAGNOSIS — M79.672 FOOT PAIN, BILATERAL: ICD-10-CM

## 2021-06-23 DIAGNOSIS — M79.89 FOOT SWELLING: Primary | ICD-10-CM

## 2021-06-23 DIAGNOSIS — I10 ESSENTIAL HYPERTENSION: ICD-10-CM

## 2021-06-23 DIAGNOSIS — M79.671 FOOT PAIN, BILATERAL: ICD-10-CM

## 2021-06-23 DIAGNOSIS — Z72.0 TOBACCO ABUSE: ICD-10-CM

## 2021-06-23 DIAGNOSIS — R09.89 OTHER SPECIFIED SYMPTOMS AND SIGNS INVOLVING THE CIRCULATORY AND RESPIRATORY SYSTEMS: ICD-10-CM

## 2021-06-23 PROBLEM — F41.9 ANXIETY: Status: ACTIVE | Noted: 2021-06-23

## 2021-06-23 PROBLEM — E78.5 HYPERLIPEMIA: Status: ACTIVE | Noted: 2021-06-23

## 2021-06-23 PROBLEM — J30.9 ALLERGIC RHINITIS: Status: ACTIVE | Noted: 2021-06-23

## 2021-06-23 PROCEDURE — 99214 OFFICE O/P EST MOD 30 MIN: CPT | Performed by: PHYSICIAN ASSISTANT

## 2021-06-23 RX ORDER — ASPIRIN 81 MG/1
TABLET ORAL
COMMUNITY

## 2021-06-23 RX ORDER — CHLORAL HYDRATE 500 MG
CAPSULE ORAL
COMMUNITY

## 2021-06-23 RX ORDER — ALBUTEROL SULFATE 90 MCG
HFA AEROSOL WITH ADAPTER (GRAM) INHALATION
COMMUNITY

## 2021-06-23 RX ORDER — CYANOCOBALAMIN (VITAMIN B-12) 1000 MCG
TABLET ORAL
COMMUNITY

## 2021-06-23 RX ORDER — LOSARTAN POTASSIUM 50 MG/1
50 TABLET ORAL DAILY
COMMUNITY
Start: 2021-05-13 | End: 2021-07-16

## 2021-06-23 NOTE — ASSESSMENT & PLAN NOTE
Discussed risks of smoking with patient including death, bp elevation, mi, stroke, CKD, blindness, slow wound healing. Pt is not ready to quit smoking at this time, encouraged to RTC once ready to quit.

## 2021-06-23 NOTE — PROGRESS NOTES
Chief Complaint  Follow-up and Edema    Subjective          Queenie Pike presents to [facility]  Feet swelling: Pt states her feet are the biggest issues  She has had swelling in legs on and off x 14 months   Swelling worse at the end of the day.  Denies fever. Legs do not feel warm to touch.   Legs are red.  She has tried compression socks which did not help because they did not fit.   She denies swelling or pain in calves.    HTN: bp has been doing well at home, taking 1/2 tab of losartan  Denies cp, palpitations, dizziness, syncope    Tob Use: does not want to quit.      Past Medical History:   Diagnosis Date   • Anemia    • Anxiety    • AR (allergic rhinitis)    • Bladder cancer (CMS/[facility]) 2009   • Bladder disorder    • Broken bones 1987   • COPD, severe (CMS/[facility]) 03/13/2015    SHE REFUSES INHALERS OTHER THAN ALBUTEROL, BUT GOT HER TO TAKE SYMBICORT SAMPLE AND TOLD HER WOULD PRESCRIBE IT IF SHE LIKES IT ALSO TOLD HER WE WOULD DO NOCTURNAL OXYGEN TESTING IF SHE IS INTERESTED, HOWEVER STATES SHE DOESNT WANT TO AT THIS TIME    • Depression    • Head injury 1987   • Hyperlipemia    • Jaundice 1987   • Macrocytosis 01/12/2016   • Pulmonary embolus (CMS/HCC) 12/15/2015    DOING WELL OFF OF MEDICATION. CONTINUE TO MONITOR. WARNED HER PERSONAL RISK ESPECIALLY IF SHE CONTINUES TO SMOKE HOWEVER SHE REALLY WANTS TO BE OFF OF MEDICINES AND UNDERSTANDS THE RISKS. DOING WELL OFF OF MEDICATION.    • Pulmonary nodule 12/22/2014    BILATERAL PULMONARY NODULE/LESION  WILL REPEAT CT SCAN IN MARCH    • Tobacco abuse 12/22/2014    SHE ISNT INTERESTED IN TOBACCO CESSATION AT THIS TIME         Past Surgical History:   Procedure Laterality Date   • BLADDER SURGERY  2009   • CYSTOSCOPY  08/13/2019    CYSTOSCOPY AND URETEROSCOPY WITH LASER LITHOTRIPSY   • HYSTERECTOMY  1989   • SALPINGO OOPHORECTOMY Bilateral 01/2015    FOR REMOVAL OF LARGE CYSTADENOMAS (LARGEST 22CM)   • TUMOR  "REMOVAL      OVARIES   • URETERAL STENT INSERTION  08/13/2019        Current Outpatient Medications on File Prior to Visit   Medication Sig Dispense Refill   • albuterol sulfate HFA (Proventil HFA) 108 (90 Base) MCG/ACT inhaler      • aspirin (aspirin) 81 MG EC tablet aspirin 81 mg oral tablet,delayed release (DR/EC) take 1 tablet (81 mg) by oral route once daily   Active     • Cyanocobalamin (B-12) 1000 MCG tablet      • losartan (COZAAR) 50 MG tablet Take 50 mg by mouth Daily.     • Omega-3 Fatty Acids (fish oil) 1000 MG capsule capsule Fish Oil 1,000 mg (120 mg-180 mg) oral capsule take 1 capsule by oral route daily   Active       No current facility-administered medications on file prior to visit.        Allergies   Allergen Reactions   • Penicillins Anaphylaxis   • Sulfa Antibiotics Anaphylaxis       Social History     Tobacco Use   Smoking Status Current Every Day Smoker   • Packs/day: 1.00   • Years: 40.00   • Pack years: 40.00   Smokeless Tobacco Never Used   Tobacco Comment    1 PK QD - HAS SMOKED OVER 20 YEARS           Objective   Vital Signs:   /80   Pulse 86   Temp 97.7 °F (36.5 °C)   Resp 15   Ht 149.9 cm (59\")   Wt 57.2 kg (126 lb)   SpO2 94%   BMI 25.45 kg/m²     Physical Exam  Vitals reviewed.   Constitutional:       Appearance: Normal appearance.   HENT:      Head: Normocephalic and atraumatic.      Nose: Nose normal.      Mouth/Throat:      Mouth: Mucous membranes are moist.   Eyes:      Extraocular Movements: Extraocular movements intact.      Conjunctiva/sclera: Conjunctivae normal.      Pupils: Pupils are equal, round, and reactive to light.   Cardiovascular:      Rate and Rhythm: Normal rate and regular rhythm.   Pulmonary:      Effort: Pulmonary effort is normal.      Breath sounds: Normal breath sounds.   Abdominal:      General: Abdomen is flat. Bowel sounds are normal.      Palpations: Abdomen is soft.   Musculoskeletal:         General: Swelling (1+ bilateral feet) present. " Normal range of motion.      Right lower leg: Edema present.      Left lower leg: Edema present.   Skin:     General: Skin is warm.      Capillary Refill: Capillary refill takes less than 2 seconds.   Neurological:      General: No focal deficit present.      Mental Status: She is alert and oriented to person, place, and time.   Psychiatric:         Mood and Affect: Mood normal.        Result Review :                 Assessment and Plan    Diagnoses and all orders for this visit:    1. Foot swelling (Primary)  Comments:  Discussed ddx. Feet wrapped in ACE today, encouraged compression socks at home. Keep apt for echo. Will refer for SHADY to eval blood flow. RTC 6 wk for follow up and labs.  Orders:  -     Cancel: Ambulatory Referral to Lymphedema Clinic  -     Ambulatory Referral to Vascular Surgery  -     Doppler Ankle Brachial Index Single Level CAR; Future    2. Foot pain, bilateral  -     Cancel: Ambulatory Referral to Lymphedema Clinic  -     Ambulatory Referral to Vascular Surgery  -     Doppler Ankle Brachial Index Single Level CAR; Future    3. Tobacco abuse  Assessment & Plan:  Discussed risks of smoking with patient including death, bp elevation, mi, stroke, CKD, blindness, slow wound healing. Pt is not ready to quit smoking at this time, encouraged to RTC once ready to quit.    Orders:  -     Cancel: Ambulatory Referral to Lymphedema Clinic  -     Ambulatory Referral to Vascular Surgery  -     Doppler Ankle Brachial Index Single Level CAR; Future    4. Essential hypertension  Comments:  BP improved, cont current medication  Orders:  -     Cancel: Ambulatory Referral to Lymphedema Clinic  -     Ambulatory Referral to Vascular Surgery  -     Doppler Ankle Brachial Index Single Level CAR; Future    5. Other specified symptoms and signs involving the circulatory and respiratory systems   -     Doppler Ankle Brachial Index Single Level CAR; Future      Follow Up   Return in about 6 weeks (around  8/4/2021).  Patient was given instructions and counseling regarding her condition or for health maintenance advice. Please see specific information pulled into the AVS if appropriate.

## 2021-07-02 ENCOUNTER — APPOINTMENT (OUTPATIENT)
Dept: CARDIOLOGY | Facility: HOSPITAL | Age: 71
End: 2021-07-02

## 2021-07-14 ENCOUNTER — HOSPITAL ENCOUNTER (OUTPATIENT)
Dept: CARDIOLOGY | Facility: HOSPITAL | Age: 71
Discharge: HOME | End: 2021-07-14
Admitting: PHYSICIAN ASSISTANT

## 2021-07-14 DIAGNOSIS — M79.671 FOOT PAIN, BILATERAL: ICD-10-CM

## 2021-07-14 DIAGNOSIS — Z72.0 TOBACCO ABUSE: ICD-10-CM

## 2021-07-14 DIAGNOSIS — M79.89 FOOT SWELLING: ICD-10-CM

## 2021-07-14 DIAGNOSIS — R09.89 OTHER SPECIFIED SYMPTOMS AND SIGNS INVOLVING THE CIRCULATORY AND RESPIRATORY SYSTEMS: ICD-10-CM

## 2021-07-14 DIAGNOSIS — I10 ESSENTIAL HYPERTENSION: ICD-10-CM

## 2021-07-14 DIAGNOSIS — M79.672 FOOT PAIN, BILATERAL: ICD-10-CM

## 2021-07-14 LAB
BH CV LOWER ARTERIAL LEFT ABI RATIO: 1.04
BH CV LOWER ARTERIAL LEFT DORSALIS PEDIS SYS MAX: 177 MMHG
BH CV LOWER ARTERIAL LEFT GREAT TOE SYS MAX: 140 MMHG
BH CV LOWER ARTERIAL LEFT POST TIBIAL SYS MAX: 0.99 MMHG
BH CV LOWER ARTERIAL RIGHT ABI RATIO: 1.06
BH CV LOWER ARTERIAL RIGHT DORSALIS PEDIS SYS MAX: 180 MMHG
BH CV LOWER ARTERIAL RIGHT GREAT TOE SYS MAX: 162 MMHG
BH CV LOWER ARTERIAL RIGHT POST TIBIAL SYS MAX: 170 MMHG
MAXIMAL PREDICTED HEART RATE: 150 BPM
STRESS TARGET HR: 128 BPM
UPPER ARTERIAL LEFT ARM BRACHIAL SYS MAX: 168 MMHG
UPPER ARTERIAL RIGHT ARM BRACHIAL SYS MAX: 170 MMHG

## 2021-07-14 PROCEDURE — 93922 UPR/L XTREMITY ART 2 LEVELS: CPT

## 2021-07-14 PROCEDURE — 93922 UPR/L XTREMITY ART 2 LEVELS: CPT | Performed by: SURGERY

## 2021-07-15 VITALS
DIASTOLIC BLOOD PRESSURE: 82 MMHG | BODY MASS INDEX: 26.31 KG/M2 | HEART RATE: 82 BPM | OXYGEN SATURATION: 94 % | SYSTOLIC BLOOD PRESSURE: 150 MMHG | HEIGHT: 59 IN | RESPIRATION RATE: 15 BRPM | TEMPERATURE: 97.9 F | WEIGHT: 130.5 LBS

## 2021-07-16 RX ORDER — LOSARTAN POTASSIUM 50 MG/1
50 TABLET ORAL DAILY
Qty: 90 TABLET | Refills: 1 | Status: SHIPPED | OUTPATIENT
Start: 2021-07-16 | End: 2022-08-04

## 2021-07-19 ENCOUNTER — OFFICE VISIT (OUTPATIENT)
Dept: VASCULAR SURGERY | Facility: HOSPITAL | Age: 71
End: 2021-07-19

## 2021-07-19 VITALS
WEIGHT: 126 LBS | TEMPERATURE: 98 F | BODY MASS INDEX: 25.4 KG/M2 | SYSTOLIC BLOOD PRESSURE: 158 MMHG | OXYGEN SATURATION: 96 % | HEIGHT: 59 IN | RESPIRATION RATE: 18 BRPM | HEART RATE: 83 BPM | DIASTOLIC BLOOD PRESSURE: 80 MMHG

## 2021-07-19 DIAGNOSIS — R60.0 BILATERAL LEG EDEMA: Primary | ICD-10-CM

## 2021-07-19 PROCEDURE — 99203 OFFICE O/P NEW LOW 30 MIN: CPT | Performed by: SURGERY

## 2021-07-19 PROCEDURE — G0463 HOSPITAL OUTPT CLINIC VISIT: HCPCS

## 2021-07-19 NOTE — PROGRESS NOTES
[facility]   HISTORY AND PHYSICAL    Patient Name: Queenie Pike  : 1950  MRN: [id number]  Primary Care Physician:  Coty Donovan PA-C  Date of admission: (Not on file)    Subjective   Subjective     Chief Complaint: Bilateral leg edema, right greater than left    HPI:    Queenie Pike is a 71 y.o. female who about 15 months ago noticed that she developed bilateral leg edema, right more than left.  At about the same time she noticed on her heart rate went up, it felt like pounding.  This lasted for a while, weeks, possibly months.  She received 1 dose of a water pill but her provider did not want to continue diuretics without knowing the cause of the edema.  She denies any other complaints at this time.  She had abdominal surgery for removal of ovarian cyst which were very large in size about 3 years ago.    Review of Systems    Non contributory except for the History of Present Illness    Personal History     Past Medical History:   Diagnosis Date   • Anemia    • Anxiety    • AR (allergic rhinitis)    • Bladder cancer (CMS/[facility])    • Bladder disorder    • Broken bones    • COPD, severe (CMS/[facility]) 2015    SHE REFUSES INHALERS OTHER THAN ALBUTEROL, BUT GOT HER TO TAKE SYMBICORT SAMPLE AND TOLD HER WOULD PRESCRIBE IT IF SHE LIKES IT ALSO TOLD HER WE WOULD DO NOCTURNAL OXYGEN TESTING IF SHE IS INTERESTED, HOWEVER STATES SHE DOESNT WANT TO AT THIS TIME    • Depression    • Head injury    • Hyperlipemia    • Jaundice    • Macrocytosis 2016   • Pulmonary embolus (CMS/[facility]) 12/15/2015    DOING WELL OFF OF MEDICATION. CONTINUE TO MONITOR. WARNED HER PERSONAL RISK ESPECIALLY IF SHE CONTINUES TO SMOKE HOWEVER SHE REALLY WANTS TO BE OFF OF MEDICINES AND UNDERSTANDS THE RISKS. DOING WELL OFF OF MEDICATION.    • Pulmonary nodule 2014    BILATERAL PULMONARY NODULE/LESION  WILL REPEAT CT SCAN IN MARCH    • Tobacco abuse 2014    SHE ISNT INTERESTED IN TOBACCO CESSATION  AT THIS TIME        Past Surgical History:   Procedure Laterality Date   • BLADDER SURGERY  2009   • CYSTOSCOPY  08/13/2019    CYSTOSCOPY AND URETEROSCOPY WITH LASER LITHOTRIPSY   • HYSTERECTOMY  1989   • SALPINGO OOPHORECTOMY Bilateral 01/2015    FOR REMOVAL OF LARGE CYSTADENOMAS (LARGEST 22CM)   • TUMOR REMOVAL      OVARIES   • URETERAL STENT INSERTION  08/13/2019       Family History: family history includes Diabetes in her sister; Lung cancer in her mother. Otherwise pertinent FHx was reviewed and not pertinent to current issue.    Social History:  reports that she has been smoking. She has a 40.00 pack-year smoking history. She has never used smokeless tobacco. She reports current alcohol use.    Home Medications:  Current Outpatient Medications on File Prior to Visit   Medication Sig   • albuterol sulfate HFA (Proventil HFA) 108 (90 Base) MCG/ACT inhaler    • aspirin (aspirin) 81 MG EC tablet aspirin 81 mg oral tablet,delayed release (DR/EC) take 1 tablet (81 mg) by oral route once daily   Active   • Cyanocobalamin (B-12) 1000 MCG tablet    • losartan (COZAAR) 50 MG tablet Take 1 tablet by mouth Daily.   • Omega-3 Fatty Acids (fish oil) 1000 MG capsule capsule Fish Oil 1,000 mg (120 mg-180 mg) oral capsule take 1 capsule by oral route daily   Active     No current facility-administered medications on file prior to visit.          Allergies:  Allergies   Allergen Reactions   • Penicillins Anaphylaxis   • Sulfa Antibiotics Anaphylaxis       Objective   Objective     Vitals:   Temp:  [98 °F (36.7 °C)] 98 °F (36.7 °C)  Heart Rate:  [83] 83  Resp:  [18] 18  BP: (158)/(80) 158/80    Physical Exam    Constitutional: Awake, alert   Neck: Supple, no bruits   Respiratory: Clear to auscultation bilaterally, nonlabored respirations    Cardiovascular: RRR, no murmurs   Abdomen: benign, no masses   Extremities: symmetric, moderate bilateral leg edema, more significant on the right than the left, slightly pitting.   Pulses:  +2 bilateral pedal pulses,    Diagnostic studies:   An arterial Doppler dated 7/14/2021 demonstrates bilateral ABIs greater than 1 with normal waveforms.    Assessment/Plan   Assessment / Plan     Active Hospital Problems:  There are no active hospital problems to display for this patient.      Diagnoses and all orders for this visit:    1. Bilateral leg edema (Primary)  -     Duplex Venous Lower Extremity - Bilateral CAR; Future  -     CT abdomen pelvis w contrast; Future        Assessment/plan:   Queenie has bilateral leg edema, more significant on the right than the left, of about 15 months duration without any evidence of clinically significant arterial insufficiency.  No previous venous or outflow obstruction evaluation.  At this time I am recommending that we obtain a venous Doppler as well as a CT scan of the abdomen and pelvis to evaluate for any evidence of previous DVT or outflow obstruction especially given her previous abdominal surgery.  She will follow-up with us after the above.      Electronically signed by Mack Mathis MD, 07/19/21, 8:55 AM EDT.

## 2021-07-30 ENCOUNTER — TELEPHONE (OUTPATIENT)
Dept: VASCULAR SURGERY | Facility: HOSPITAL | Age: 71
End: 2021-07-30

## 2021-07-30 NOTE — TELEPHONE ENCOUNTER
----- Message from Briana Bahena MA sent at 7/19/2021  9:52 AM EDT -----  MAKE SURE PT IS SCHEDULED FOR CT AND VELE BEFORE 8/11

## 2021-08-06 ENCOUNTER — HOSPITAL ENCOUNTER (OUTPATIENT)
Dept: CT IMAGING | Facility: HOSPITAL | Age: 71
Discharge: HOME | End: 2021-08-06

## 2021-08-06 ENCOUNTER — HOSPITAL ENCOUNTER (OUTPATIENT)
Dept: CARDIOLOGY | Facility: HOSPITAL | Age: 71
Discharge: HOME | End: 2021-08-06

## 2021-08-06 DIAGNOSIS — R60.0 BILATERAL LEG EDEMA: ICD-10-CM

## 2021-08-06 LAB
BH CV LOWER VASCULAR LEFT COMMON FEMORAL AUGMENT: NORMAL
BH CV LOWER VASCULAR LEFT COMMON FEMORAL COMPETENT: NORMAL
BH CV LOWER VASCULAR LEFT COMMON FEMORAL COMPRESS: NORMAL
BH CV LOWER VASCULAR LEFT COMMON FEMORAL PHASIC: NORMAL
BH CV LOWER VASCULAR LEFT COMMON FEMORAL SPONT: NORMAL
BH CV LOWER VASCULAR LEFT DISTAL FEMORAL COMPRESS: NORMAL
BH CV LOWER VASCULAR LEFT DISTAL FEMORAL PHASIC: NORMAL
BH CV LOWER VASCULAR LEFT GREATER SAPH AK COMPRESS: NORMAL
BH CV LOWER VASCULAR LEFT GREATER SAPH AK PHASIC: NORMAL
BH CV LOWER VASCULAR LEFT MID FEMORAL AUGMENT: NORMAL
BH CV LOWER VASCULAR LEFT MID FEMORAL COMPETENT: NORMAL
BH CV LOWER VASCULAR LEFT MID FEMORAL COMPRESS: NORMAL
BH CV LOWER VASCULAR LEFT MID FEMORAL PHASIC: NORMAL
BH CV LOWER VASCULAR LEFT MID FEMORAL SPONT: NORMAL
BH CV LOWER VASCULAR LEFT PERONEAL COMPRESS: NORMAL
BH CV LOWER VASCULAR LEFT PERONEAL PHASIC: NORMAL
BH CV LOWER VASCULAR LEFT POPLITEAL AUGMENT: NORMAL
BH CV LOWER VASCULAR LEFT POPLITEAL COMPETENT: NORMAL
BH CV LOWER VASCULAR LEFT POPLITEAL COMPRESS: NORMAL
BH CV LOWER VASCULAR LEFT POPLITEAL PHASIC: NORMAL
BH CV LOWER VASCULAR LEFT POPLITEAL SPONT: NORMAL
BH CV LOWER VASCULAR LEFT POSTERIOR TIBIAL COMPRESS: NORMAL
BH CV LOWER VASCULAR LEFT POSTERIOR TIBIAL PHASIC: NORMAL
BH CV LOWER VASCULAR LEFT PROXIMAL FEMORAL COMPRESS: NORMAL
BH CV LOWER VASCULAR LEFT PROXIMAL FEMORAL PHASIC: NORMAL
BH CV LOWER VASCULAR RIGHT COMMON FEMORAL AUGMENT: NORMAL
BH CV LOWER VASCULAR RIGHT COMMON FEMORAL COMPETENT: NORMAL
BH CV LOWER VASCULAR RIGHT COMMON FEMORAL COMPRESS: NORMAL
BH CV LOWER VASCULAR RIGHT COMMON FEMORAL PHASIC: NORMAL
BH CV LOWER VASCULAR RIGHT COMMON FEMORAL SPONT: NORMAL
BH CV LOWER VASCULAR RIGHT DISTAL FEMORAL COMPRESS: NORMAL
BH CV LOWER VASCULAR RIGHT DISTAL FEMORAL PHASIC: NORMAL
BH CV LOWER VASCULAR RIGHT GREATER SAPH AK COMPRESS: NORMAL
BH CV LOWER VASCULAR RIGHT GREATER SAPH AK PHASIC: NORMAL
BH CV LOWER VASCULAR RIGHT MID FEMORAL AUGMENT: NORMAL
BH CV LOWER VASCULAR RIGHT MID FEMORAL COMPETENT: NORMAL
BH CV LOWER VASCULAR RIGHT MID FEMORAL COMPRESS: NORMAL
BH CV LOWER VASCULAR RIGHT MID FEMORAL PHASIC: NORMAL
BH CV LOWER VASCULAR RIGHT MID FEMORAL SPONT: NORMAL
BH CV LOWER VASCULAR RIGHT PERONEAL COMPRESS: NORMAL
BH CV LOWER VASCULAR RIGHT PERONEAL PHASIC: NORMAL
BH CV LOWER VASCULAR RIGHT POPLITEAL AUGMENT: NORMAL
BH CV LOWER VASCULAR RIGHT POPLITEAL COMPETENT: NORMAL
BH CV LOWER VASCULAR RIGHT POPLITEAL COMPRESS: NORMAL
BH CV LOWER VASCULAR RIGHT POPLITEAL PHASIC: NORMAL
BH CV LOWER VASCULAR RIGHT POPLITEAL SPONT: NORMAL
BH CV LOWER VASCULAR RIGHT POSTERIOR TIBIAL COMPRESS: NORMAL
BH CV LOWER VASCULAR RIGHT POSTERIOR TIBIAL PHASIC: NORMAL
BH CV LOWER VASCULAR RIGHT PROXIMAL FEMORAL COMPRESS: NORMAL
BH CV LOWER VASCULAR RIGHT PROXIMAL FEMORAL PHASIC: NORMAL
CREAT BLDA-MCNC: 0.7 MG/DL
MAXIMAL PREDICTED HEART RATE: 150 BPM
STRESS TARGET HR: 128 BPM

## 2021-08-06 PROCEDURE — 93970 EXTREMITY STUDY: CPT

## 2021-08-06 PROCEDURE — 82565 ASSAY OF CREATININE: CPT

## 2021-08-06 PROCEDURE — 74177 CT ABD & PELVIS W/CONTRAST: CPT

## 2021-08-06 PROCEDURE — 0 IOPAMIDOL PER 1 ML: Performed by: SURGERY

## 2021-08-06 PROCEDURE — 93970 EXTREMITY STUDY: CPT | Performed by: SURGERY

## 2021-08-06 RX ADMIN — IOPAMIDOL 100 ML: 755 INJECTION, SOLUTION INTRAVENOUS at 10:24

## 2021-08-22 PROBLEM — Z85.51 HISTORY OF BLADDER CANCER: Status: ACTIVE | Noted: 2021-08-22

## 2021-08-22 PROBLEM — N20.0 NEPHROLITHIASIS: Status: ACTIVE | Noted: 2021-08-22

## 2021-09-01 ENCOUNTER — OFFICE VISIT (OUTPATIENT)
Dept: VASCULAR SURGERY | Facility: HOSPITAL | Age: 71
End: 2021-09-01

## 2021-09-01 VITALS
OXYGEN SATURATION: 97 % | DIASTOLIC BLOOD PRESSURE: 80 MMHG | SYSTOLIC BLOOD PRESSURE: 150 MMHG | RESPIRATION RATE: 14 BRPM | TEMPERATURE: 98.3 F | HEART RATE: 76 BPM

## 2021-09-01 DIAGNOSIS — I73.9 PAD (PERIPHERAL ARTERY DISEASE) (HCC): ICD-10-CM

## 2021-09-01 DIAGNOSIS — R60.0 BILATERAL LEG EDEMA: Primary | ICD-10-CM

## 2021-09-01 PROCEDURE — G0463 HOSPITAL OUTPT CLINIC VISIT: HCPCS | Performed by: SURGERY

## 2021-09-01 PROCEDURE — G0463 HOSPITAL OUTPT CLINIC VISIT: HCPCS

## 2021-09-01 PROCEDURE — 99213 OFFICE O/P EST LOW 20 MIN: CPT | Performed by: SURGERY

## 2021-09-01 NOTE — PROGRESS NOTES
[facility]   Follow up Office    Patient Name: Queenie Pike  : 1950  MRN: [id number]  Primary Care Physician:  Coty Donovan PA-C      Subjective   Subjective     HPI:    Queenie Pike is a 71 y.o. female who returns to the office in follow-up to discuss results of a venous Doppler as well as a CT scan of the abdomen and pelvis.  Once again she denies any symptoms to suggest intermittent claudication.  Her most significant concern is the leg edema which is unchanged.      Objective     Vitals:   Temp:  [98.3 °F (36.8 °C)] 98.3 °F (36.8 °C)  Heart Rate:  [76] 76  Resp:  [14] 14  BP: (150)/(80) 150/80    Physical Exam      General: Alert, no acute distress.  Neck: Supple  Heart: Regular rate  Lungs: Clear  Abdomen: Benign  Extremities: Mild to moderate bilateral lower extremity pitting edema.    Diagnostic studies: A venous Doppler in the office today demonstrates normal findings.  A CT scan of the abdomen and pelvis with IV contrast has been personally reviewed.  There is no evidence of venous outflow obstruction.  There is severe calcification of the aorta with a large endophytic calcified plaque at the level of the TANI with approximately 50% stenosis of the aorta.    Assessment/Plan   Assessment / Plan     Diagnoses and all orders for this visit:    1. Bilateral leg edema (Primary)    2. PAD (peripheral artery disease) (CMS/[facility])       Assessment/Plan:   Mrs. Pike has bilateral leg edema without physical findings to suggest a vascular problem.  She has significant peripheral vascular disease based on her CT scan but this is asymptomatic at this time.  I am recommending for her to follow-up with her primary care provider to discuss other potential medical etiologies for her edema.  Otherwise she will follow-up with us in 1 year with an arterial Doppler.        Electronically signed by Mack Mathis MD, 21, 9:54 AM EDT.

## 2021-09-02 ENCOUNTER — TELEPHONE (OUTPATIENT)
Dept: INTERNAL MEDICINE | Facility: CLINIC | Age: 71
End: 2021-09-02

## 2021-09-02 NOTE — TELEPHONE ENCOUNTER
Caller: Queenie Pike    Relationship: Self    Best call back number: [phone]     What is the best time to reach you: ANY    Who are you requesting to speak with (clinical staff, provider,  specific staff member): MINDY    What was the call regarding: PATIENT STATES SHE HAS DONE THE CT SCAN, ULTRA SOUNDS AND BLOOD WORK. SHE WOULD LIKE TO KNOW HER NEXT STEP IS AND AS OF TODAY, SHE WILL NO LONGER HAVE INSURANCE.     WOULD LIKE TO TALK TO YOU REGARDING SOME WATER PILLS    Do you require a callback: YES, PLEASE CALL AND ADVISE

## 2021-09-03 NOTE — TELEPHONE ENCOUNTER
Please tell her that I reviewed her vascular note which was normal. The next step would be to get an echocardiogram, as soon as she gets insurance I would recommend we get this to look at her heart. Previously she had BNP which was normal and suggests that the heart function is preserved but that would be the next step in the workup. We can try low dose Lasix PRN but since BNP was normal on labs it is not likely going to help. Also she had tried water pills in the past without improvement. Ask if she wants to try lasix 10mg PO PRN swelling, if so please send 30 with 0 refill.

## 2021-09-09 RX ORDER — FUROSEMIDE 20 MG/1
10 TABLET ORAL DAILY
Qty: 15 TABLET | Refills: 0 | Status: SHIPPED | OUTPATIENT
Start: 2021-09-09 | End: 2021-10-08 | Stop reason: SDUPTHER

## 2021-09-09 NOTE — TELEPHONE ENCOUNTER
Patient called back and discussed what Coty Donovan had documented and sent in medications. Lasix only comes in 20mg so discussed with patient that she would be taking 0.5 tablet. Patient stated understanding. No other issues or concerns noted currently per patient.

## 2021-09-21 RX ORDER — LOSARTAN POTASSIUM 50 MG/1
50 TABLET ORAL DAILY
Qty: 90 TABLET | Refills: 1 | OUTPATIENT
Start: 2021-09-21

## 2021-09-21 NOTE — TELEPHONE ENCOUNTER
Caller: Queenie Piek    Relationship: Self      Medication requested (name and dosage):    losartan (COZAAR) 50 MG tablet         Pharmacy where request should be sent: [facility] - [address] - [phone]  - [phone]   [phone]    Additional details provided by patient: PATIENT HAS 5 DAYS LEFT OF MEDICATION    Best call back number: [phone]    Does the patient have less than a 3 day supply:  [] Yes  [x] No    Eldon Dodson Rep   09/21/21 14:54 EDT

## 2021-09-23 NOTE — TELEPHONE ENCOUNTER
Caller: Queenie Pike    Relationship: Self    Best call back number: [phone]    What was the call regarding: PATIENT CALLING TO CHECK ON THE STATUS OF THIS REFILL. PLEASE CALL PATIENT TO UPDATE HER. THANK YOU     Do you require a callback:  YES

## 2021-10-08 RX ORDER — FUROSEMIDE 20 MG/1
10 TABLET ORAL DAILY
Qty: 15 TABLET | Refills: 0 | Status: SHIPPED | OUTPATIENT
Start: 2021-10-08 | End: 2021-10-29 | Stop reason: SDUPTHER

## 2021-10-08 NOTE — TELEPHONE ENCOUNTER
Caller: Queenie Pike    Relationship: Self      Medication requested (name and dosage): furosemide (Lasix) 20 MG tablet    Pharmacy where request should be sent: [facility] - [address] [phone] [place] [phone]   [phone]      Best call back number: [phone]     Does the patient have less than a 3 day supply:  [] Yes  [x] No    Eldon Hood Rep   10/08/21 15:17 EDT

## 2021-10-15 ENCOUNTER — OFFICE VISIT (OUTPATIENT)
Dept: UROLOGY | Facility: CLINIC | Age: 71
End: 2021-10-15

## 2021-10-15 VITALS — BODY MASS INDEX: 25.4 KG/M2 | RESPIRATION RATE: 17 BRPM | HEIGHT: 59 IN | WEIGHT: 126 LBS

## 2021-10-15 DIAGNOSIS — Z85.51 HISTORY OF BLADDER CANCER: Primary | ICD-10-CM

## 2021-10-15 DIAGNOSIS — N20.0 NEPHROLITHIASIS: ICD-10-CM

## 2021-10-15 LAB
BACTERIA UR QL AUTO: NORMAL /HPF
BILIRUB UR QL STRIP: NEGATIVE
CLARITY UR: ABNORMAL
COLOR UR: ABNORMAL
GLUCOSE UR STRIP-MCNC: NEGATIVE MG/DL
HGB UR QL STRIP.AUTO: NEGATIVE
HYALINE CASTS UR QL AUTO: NORMAL /LPF
KETONES UR QL STRIP: NEGATIVE
LEUKOCYTE ESTERASE UR QL STRIP.AUTO: NEGATIVE
NITRITE UR QL STRIP: NEGATIVE
PH UR STRIP.AUTO: 6.5 [PH] (ref 5–8)
PROT UR QL STRIP: NEGATIVE
RBC # UR: NORMAL /HPF
REF LAB TEST METHOD: NORMAL
SP GR UR STRIP: 1.01 (ref 1–1.03)
SQUAMOUS #/AREA URNS HPF: NORMAL /HPF
UROBILINOGEN UR QL STRIP: ABNORMAL
WBC UR QL AUTO: NORMAL /HPF

## 2021-10-15 PROCEDURE — 88108 CYTOPATH CONCENTRATE TECH: CPT | Performed by: UROLOGY

## 2021-10-15 PROCEDURE — 99213 OFFICE O/P EST LOW 20 MIN: CPT | Performed by: UROLOGY

## 2021-10-15 PROCEDURE — 81001 URINALYSIS AUTO W/SCOPE: CPT | Performed by: UROLOGY

## 2021-10-18 LAB
CYTO UR: NORMAL
LAB AP CASE REPORT: NORMAL
LAB AP CLINICAL INFORMATION: NORMAL
LAB AP NON-GYN INTERPRETATION: NORMAL
PATH REPORT.FINAL DX SPEC: NORMAL
PATH REPORT.GROSS SPEC: NORMAL
STAT OF ADQ CVX/VAG CYTO-IMP: NORMAL

## 2021-10-19 ENCOUNTER — TELEPHONE (OUTPATIENT)
Dept: INTERNAL MEDICINE | Facility: CLINIC | Age: 71
End: 2021-10-19

## 2021-10-19 NOTE — TELEPHONE ENCOUNTER
Caller: Shahzad Queenie Bristol    Relationship: Self    Best call back number: [phone]    What is the best time to reach you: ANY    Who are you requesting to speak with (clinical staff, provider,  specific staff member): CLINICAL    What was the call regarding: PATIENT WAS IN THE PROCESS OF GETTING A FEW TESTS DONE. HOWEVER SHE LOST HER INSURANCE. THAT IS NOW REINSTATED. SHE WOULD LIKE TO CONTINUE THE TESTS THAT WERE IN PROCESS. SHE THINKS A CT WAS BEING SCHEDULED AND HER FEET ARE EXTREMELY SWOLLEN. PLEASE CALL AND ADVISE. INSURANCE IS UPDATED IN SYSTEM    Do you require a callback: YES

## 2021-10-20 DIAGNOSIS — I70.0 STENOSIS OF INFRARENAL ABDOMINAL AORTA DUE TO ARTERIOSCLEROSIS (HCC): Primary | ICD-10-CM

## 2021-10-20 NOTE — TELEPHONE ENCOUNTER
Pt needs to be seen by vascular STAT, referral was placed in June but due to insurance she did not go. She had CT in 8/2021 that was significantly abnormal so she needs apt this week.

## 2021-10-27 ENCOUNTER — TELEPHONE (OUTPATIENT)
Dept: INTERNAL MEDICINE | Facility: CLINIC | Age: 71
End: 2021-10-27

## 2021-10-27 NOTE — TELEPHONE ENCOUNTER
Caller: Queenie Pike    Relationship to patient: Self    Best call back number: [phone]    Patient is needing: PATIENT CALLED STATING SHE IS RETURNING A CALL BACK FOR RIKA REGARDING A REFERRAL [facility] REFERRED FOR THE PATIENT. SHE WOULD LIKE A CALL BACK PLEASE ADVISE THANK YOU.         HUB STAFF UNABLE TO WARM TRANSFER

## 2021-10-29 ENCOUNTER — OFFICE VISIT (OUTPATIENT)
Dept: INTERNAL MEDICINE | Facility: CLINIC | Age: 71
End: 2021-10-29

## 2021-10-29 VITALS
HEART RATE: 75 BPM | DIASTOLIC BLOOD PRESSURE: 68 MMHG | OXYGEN SATURATION: 99 % | TEMPERATURE: 97 F | HEIGHT: 59 IN | SYSTOLIC BLOOD PRESSURE: 132 MMHG | BODY MASS INDEX: 25.6 KG/M2 | WEIGHT: 127 LBS

## 2021-10-29 DIAGNOSIS — R60.0 LOWER EXTREMITY EDEMA: ICD-10-CM

## 2021-10-29 DIAGNOSIS — I73.9 CLAUDICATION (HCC): Primary | ICD-10-CM

## 2021-10-29 PROBLEM — D64.9 ANEMIA: Status: ACTIVE | Noted: 2021-10-29

## 2021-10-29 PROBLEM — R17 JAUNDICE: Status: ACTIVE | Noted: 2021-10-29

## 2021-10-29 PROBLEM — C67.9 BLADDER CANCER: Status: ACTIVE | Noted: 2021-10-29

## 2021-10-29 PROBLEM — S09.90XA HEAD INJURY: Status: ACTIVE | Noted: 2021-10-29

## 2021-10-29 PROCEDURE — 99213 OFFICE O/P EST LOW 20 MIN: CPT | Performed by: INTERNAL MEDICINE

## 2021-10-29 RX ORDER — FUROSEMIDE 20 MG/1
20 TABLET ORAL DAILY
Qty: 15 TABLET | Refills: 0 | Status: SHIPPED | OUTPATIENT
Start: 2021-10-29 | End: 2021-11-16 | Stop reason: SDUPTHER

## 2021-11-12 ENCOUNTER — TELEPHONE (OUTPATIENT)
Dept: VASCULAR SURGERY | Facility: HOSPITAL | Age: 71
End: 2021-11-12

## 2021-11-12 ENCOUNTER — APPOINTMENT (OUTPATIENT)
Dept: CT IMAGING | Facility: HOSPITAL | Age: 71
End: 2021-11-12

## 2021-11-16 NOTE — TELEPHONE ENCOUNTER
Caller: Queenie Pike    Relationship: Self    Best call back number: [phone]    Requested Prescriptions:   Requested Prescriptions     Pending Prescriptions Disp Refills   • furosemide (Lasix) 20 MG tablet 15 tablet 0     Sig: Take 1 tablet by mouth Daily.        Pharmacy where request should be sent: [address] [phone] [facility] [phone] [facility]     Additional details provided by patient: PATIENT HAS 3 DAYS LEFT    Does the patient have less than a 3 day supply:  [x] Yes  [] No    Eldon White Rep   11/16/21 15:19 EST

## 2021-11-17 RX ORDER — FUROSEMIDE 20 MG/1
20 TABLET ORAL DAILY
Qty: 30 TABLET | Refills: 0 | Status: SHIPPED | OUTPATIENT
Start: 2021-11-17

## 2021-11-18 ENCOUNTER — TELEPHONE (OUTPATIENT)
Dept: INTERNAL MEDICINE | Facility: CLINIC | Age: 71
End: 2021-11-18

## 2021-11-18 ENCOUNTER — APPOINTMENT (OUTPATIENT)
Dept: CT IMAGING | Facility: HOSPITAL | Age: 71
End: 2021-11-18

## 2021-11-18 NOTE — TELEPHONE ENCOUNTER
Caller: Queenie Pike    Relationship: Self    Best call back number: [phone]    What is the best time to reach you:ANYTIME    Who are you requesting to speak with (clinical staff, provider,  specific staff member):CLINICAL    Do you know the name of the person who called: N/A    What was the call regarding:PATIENT STATED SHE HAD A CT SCAN SCHEDULED AND WAS TOLD IT WAS CANCELED BY THE PROVIDER AND WOULD LIKE TO KNOW WHY.    Do you require a callback:YES

## 2021-11-22 DIAGNOSIS — M79.605 PAIN IN BOTH LOWER EXTREMITIES: Primary | ICD-10-CM

## 2021-11-22 DIAGNOSIS — M79.604 PAIN IN BOTH LOWER EXTREMITIES: Primary | ICD-10-CM

## 2021-12-14 ENCOUNTER — HOSPITAL ENCOUNTER (OUTPATIENT)
Dept: CARDIOLOGY | Facility: HOSPITAL | Age: 71
Discharge: HOME | End: 2021-12-14
Admitting: INTERNAL MEDICINE

## 2021-12-14 DIAGNOSIS — M79.605 PAIN IN BOTH LOWER EXTREMITIES: ICD-10-CM

## 2021-12-14 DIAGNOSIS — M79.604 PAIN IN BOTH LOWER EXTREMITIES: ICD-10-CM

## 2021-12-14 LAB
BH CV LOWER ARTERIAL LEFT ABI RATIO: 1.05
BH CV LOWER ARTERIAL LEFT DORSALIS PEDIS SYS MAX: 159 MMHG
BH CV LOWER ARTERIAL LEFT GREAT TOE SYS MAX: 146 MMHG
BH CV LOWER ARTERIAL LEFT LOW THIGH SYS MAX: 166 MMHG
BH CV LOWER ARTERIAL LEFT POPLITEAL SYS MAX: 153 MMHG
BH CV LOWER ARTERIAL LEFT POST TIBIAL SYS MAX: 152 MMHG
BH CV LOWER ARTERIAL LEFT TBI RATIO: 0.96
BH CV LOWER ARTERIAL RIGHT ABI RATIO: 1.07
BH CV LOWER ARTERIAL RIGHT DORSALIS PEDIS SYS MAX: 162 MMHG
BH CV LOWER ARTERIAL RIGHT GREAT TOE SYS MAX: 162 MMHG
BH CV LOWER ARTERIAL RIGHT LOW THIGH SYS MAX: 184 MMHG
BH CV LOWER ARTERIAL RIGHT POPLITEAL SYS MAX: 158 MMHG
BH CV LOWER ARTERIAL RIGHT POST TIBIAL SYS MAX: 161 MMHG
BH CV LOWER ARTERIAL RIGHT TBI RATIO: 1.07
MAXIMAL PREDICTED HEART RATE: 150 BPM
STRESS TARGET HR: 128 BPM
UPPER ARTERIAL LEFT ARM BRACHIAL SYS MAX: 151 MMHG
UPPER ARTERIAL RIGHT ARM BRACHIAL SYS MAX: 152 MMHG

## 2021-12-14 PROCEDURE — 93923 UPR/LXTR ART STDY 3+ LVLS: CPT

## 2021-12-14 PROCEDURE — 93923 UPR/LXTR ART STDY 3+ LVLS: CPT | Performed by: SURGERY

## 2021-12-16 ENCOUNTER — OFFICE VISIT (OUTPATIENT)
Dept: INTERNAL MEDICINE | Facility: CLINIC | Age: 71
End: 2021-12-16

## 2021-12-16 VITALS
RESPIRATION RATE: 16 BRPM | TEMPERATURE: 97.8 F | WEIGHT: 127 LBS | BODY MASS INDEX: 25.6 KG/M2 | SYSTOLIC BLOOD PRESSURE: 150 MMHG | DIASTOLIC BLOOD PRESSURE: 78 MMHG | OXYGEN SATURATION: 100 % | HEART RATE: 73 BPM | HEIGHT: 59 IN

## 2021-12-16 DIAGNOSIS — R60.0 LOWER EXTREMITY EDEMA: Primary | ICD-10-CM

## 2021-12-16 DIAGNOSIS — Z85.51 HISTORY OF BLADDER CANCER: ICD-10-CM

## 2021-12-16 DIAGNOSIS — I10 ESSENTIAL HYPERTENSION: ICD-10-CM

## 2021-12-16 DIAGNOSIS — J44.9 COPD, SEVERE (HCC): ICD-10-CM

## 2021-12-16 DIAGNOSIS — Z72.0 TOBACCO ABUSE: ICD-10-CM

## 2021-12-16 DIAGNOSIS — E78.2 MIXED HYPERLIPIDEMIA: ICD-10-CM

## 2021-12-16 DIAGNOSIS — R91.1 SOLITARY PULMONARY NODULE: ICD-10-CM

## 2021-12-16 LAB
CHOLEST SERPL-MCNC: 274 MG/DL (ref 0–200)
HDLC SERPL-MCNC: 135 MG/DL (ref 40–60)
LDLC SERPL CALC-MCNC: 127 MG/DL (ref 0–100)
LDLC/HDLC SERPL: 0.92 {RATIO}
NT-PROBNP SERPL-MCNC: 353.1 PG/ML (ref 0–900)
TRIGL SERPL-MCNC: 77 MG/DL (ref 0–150)
TSH SERPL DL<=0.05 MIU/L-ACNC: 1.63 UIU/ML (ref 0.27–4.2)
VLDLC SERPL-MCNC: 12 MG/DL (ref 5–40)

## 2021-12-16 PROCEDURE — 80061 LIPID PANEL: CPT | Performed by: PHYSICIAN ASSISTANT

## 2021-12-16 PROCEDURE — 36415 COLL VENOUS BLD VENIPUNCTURE: CPT | Performed by: PHYSICIAN ASSISTANT

## 2021-12-16 PROCEDURE — 84443 ASSAY THYROID STIM HORMONE: CPT | Performed by: PHYSICIAN ASSISTANT

## 2021-12-16 PROCEDURE — 99214 OFFICE O/P EST MOD 30 MIN: CPT | Performed by: PHYSICIAN ASSISTANT

## 2021-12-16 PROCEDURE — 83880 ASSAY OF NATRIURETIC PEPTIDE: CPT | Performed by: PHYSICIAN ASSISTANT

## 2021-12-16 RX ORDER — HYDROCHLOROTHIAZIDE 12.5 MG/1
12.5 TABLET ORAL DAILY
Qty: 30 TABLET | Refills: 3 | Status: SHIPPED | OUTPATIENT
Start: 2021-12-16 | End: 2022-03-18

## 2021-12-16 NOTE — ASSESSMENT & PLAN NOTE
Discussed risks of smoking with patient including death, blood pressure elevation, heart attack, stroke, kidney disese, blindness, slow wound healing. Pt is not ready to quit smoking at this time, encouraged to RTC once ready to quit.

## 2021-12-16 NOTE — PROGRESS NOTES
Chief Complaint  Edema    Subjective          Queenie Pike presents to [facility]  Pt having lower extremity edema.   Swelling is mostly in ankles and feet. Denies swelling of calves.   She never had this before last year. She has seen vascular.   Recently had SHADY which was normal.  States she does not exercise due to pain in legs  Denies salt intake   Pt drinks 3 cups of water per day, 1 cup of coffee    HTN: denies cp, palpitations, dizziness    COPD: denies cough, wheezing, sob. Using albuterol prn.    Smoking:avg 1ppd. Pt does not want to quit at this time.    Hx of bladder cancer: denies chemo or radiation tx. Sees urology annually.      Past Medical History:   Diagnosis Date   • Anemia    • Anxiety    • AR (allergic rhinitis)    • Bladder cancer (HCC) 2009   • Bladder disorder    • Broken bones 1987   • COPD, severe (HCC) 03/13/2015    SHE REFUSES INHALERS OTHER THAN ALBUTEROL, BUT GOT HER TO TAKE SYMBICORT SAMPLE AND TOLD HER WOULD PRESCRIBE IT IF SHE LIKES IT ALSO TOLD HER WE WOULD DO NOCTURNAL OXYGEN TESTING IF SHE IS INTERESTED, HOWEVER STATES SHE DOESNT WANT TO AT THIS TIME    • Depression    • Head injury 1987   • Hyperlipemia    • Jaundice 1987   • Macrocytosis 01/12/2016   • Pulmonary embolus (HCC) 12/15/2015    DOING WELL OFF OF MEDICATION. CONTINUE TO MONITOR. WARNED HER PERSONAL RISK ESPECIALLY IF SHE CONTINUES TO SMOKE HOWEVER SHE REALLY WANTS TO BE OFF OF MEDICINES AND UNDERSTANDS THE RISKS. DOING WELL OFF OF MEDICATION.    • Pulmonary nodule 12/22/2014    BILATERAL PULMONARY NODULE/LESION  WILL REPEAT CT SCAN IN MARCH    • Tobacco abuse 12/22/2014    SHE ISNT INTERESTED IN TOBACCO CESSATION AT THIS TIME         Past Surgical History:   Procedure Laterality Date   • BLADDER SURGERY  2009   • CYSTOSCOPY  08/13/2019    CYSTOSCOPY AND URETEROSCOPY WITH LASER LITHOTRIPSY   • HYSTERECTOMY  1989   • SALPINGO OOPHORECTOMY Bilateral 01/2015    FOR  "REMOVAL OF LARGE CYSTADENOMAS (LARGEST 22CM)   • TUMOR REMOVAL      OVARIES   • URETERAL STENT INSERTION  08/13/2019        Current Outpatient Medications on File Prior to Visit   Medication Sig Dispense Refill   • albuterol sulfate HFA (Proventil HFA) 108 (90 Base) MCG/ACT inhaler      • aspirin (aspirin) 81 MG EC tablet aspirin 81 mg oral tablet,delayed release (DR/EC) take 1 tablet (81 mg) by oral route once daily   Active     • Cyanocobalamin (B-12) 1000 MCG tablet      • Diclofenac Sodium (Voltaren) 1 % gel gel Apply 4 g topically to the appropriate area as directed 4 (Four) Times a Day As Needed (hand pain). 100 g 1   • furosemide (Lasix) 20 MG tablet Take 1 tablet by mouth Daily. 30 tablet 0   • losartan (COZAAR) 50 MG tablet Take 1 tablet by mouth Daily. 90 tablet 1   • Omega-3 Fatty Acids (fish oil) 1000 MG capsule capsule Fish Oil 1,000 mg (120 mg-180 mg) oral capsule take 1 capsule by oral route daily   Active       No current facility-administered medications on file prior to visit.        Allergies   Allergen Reactions   • Penicillins Anaphylaxis   • Sulfa Antibiotics Anaphylaxis       Social History     Tobacco Use   Smoking Status Current Every Day Smoker   • Packs/day: 1.00   • Years: 40.00   • Pack years: 40.00   Smokeless Tobacco Never Used   Tobacco Comment    1 PK QD - HAS SMOKED OVER 20 YEARS           Objective   Vital Signs:   /78   Pulse 73   Temp 97.8 °F (36.6 °C)   Resp 16   Ht 149.9 cm (59\")   Wt 57.6 kg (127 lb)   SpO2 100%   BMI 25.65 kg/m²     Physical Exam  Vitals reviewed.   Constitutional:       Appearance: Normal appearance.   HENT:      Head: Normocephalic and atraumatic.      Nose: Nose normal.      Mouth/Throat:      Mouth: Mucous membranes are moist.   Eyes:      Extraocular Movements: Extraocular movements intact.      Conjunctiva/sclera: Conjunctivae normal.      Pupils: Pupils are equal, round, and reactive to light.   Cardiovascular:      Rate and Rhythm: Normal " rate and regular rhythm.      Pulses: Normal pulses.      Heart sounds: Normal heart sounds.      Comments: Minimal edema noted on bilateral ankles.  Pulmonary:      Effort: Pulmonary effort is normal.      Breath sounds: Normal breath sounds.   Abdominal:      General: Abdomen is flat. Bowel sounds are normal.      Palpations: Abdomen is soft.   Musculoskeletal:         General: Normal range of motion.   Neurological:      General: No focal deficit present.      Mental Status: She is alert and oriented to person, place, and time.   Psychiatric:         Mood and Affect: Mood normal.        Result Review :                 Assessment and Plan    Diagnoses and all orders for this visit:    1. Lower extremity edema (Primary)  Comments:  Reviewed normal SHADY. Keep follow up with vascular. Will start HCTZ to help with bp control and swelling. Use lasix prn only.  Orders:  -     Comprehensive Metabolic Panel  -     CBC & Differential  -     TSH  -     Lipid Panel  -     proBNP    2. Mixed hyperlipidemia  -     Comprehensive Metabolic Panel  -     CBC & Differential  -     TSH  -     Lipid Panel    3. Essential hypertension  Assessment & Plan:  Discussed blood pressure elevation at today's visit. Risks of blood pressure elevation include death, heart attack, stroke, kidney disease, blindness. Low salt diet, increase exercise. Discussed importance of medication compliance and not missing doses. Cont Losartan, will start HCTZ to help with swelling. Will monitor kidney function closely. We will monitor at follow up and adjust medications if still elevated. To er if chest pain, palpitations, vision loss, unilateral weakness, altered mental status. Pt understands and agrees with plan.      Orders:  -     Comprehensive Metabolic Panel  -     CBC & Differential  -     TSH  -     Lipid Panel  -     proBNP    4. History of bladder cancer  -     Comprehensive Metabolic Panel  -     CBC & Differential  -     TSH  -     Lipid  Panel    5. COPD, severe (HCC)  -     CT Chest Without Contrast; Future    6. Tobacco abuse  Assessment & Plan:  Discussed risks of smoking with patient including death, blood pressure elevation, heart attack, stroke, kidney disese, blindness, slow wound healing. Pt is not ready to quit smoking at this time, encouraged to RTC once ready to quit.      Orders:  -     CT Chest Without Contrast; Future    7. Solitary pulmonary nodule  Assessment & Plan:  Will get updated CT chest.    Orders:  -     CT Chest Without Contrast; Future    Other orders  -     hydroCHLOROthiazide (HYDRODIURIL) 12.5 MG tablet; Take 1 tablet by mouth Daily.  Dispense: 30 tablet; Refill: 3      Follow Up   Return in about 3 months (around 3/16/2022).  Patient was given instructions and counseling regarding her condition or for health maintenance advice. Please see specific information pulled into the AVS if appropriate.

## 2021-12-16 NOTE — ASSESSMENT & PLAN NOTE
Discussed blood pressure elevation at today's visit. Risks of blood pressure elevation include death, heart attack, stroke, kidney disease, blindness. Low salt diet, increase exercise. Discussed importance of medication compliance and not missing doses. Cont Losartan, will start HCTZ to help with swelling. Will monitor kidney function closely. We will monitor at follow up and adjust medications if still elevated. To er if chest pain, palpitations, vision loss, unilateral weakness, altered mental status. Pt understands and agrees with plan.

## 2021-12-16 NOTE — PATIENT INSTRUCTIONS
Steps to Quit Smoking  Smoking tobacco is the leading cause of preventable death. It can affect almost every organ in the body. Smoking puts you and people around you at risk for many serious, long-lasting (chronic) diseases. Quitting smoking can be hard, but it is one of the best things that you can do for your health. It is never too late to quit.  How do I get ready to quit?  When you decide to quit smoking, make a plan to help you succeed. Before you quit:  · Pick a date to quit. Set a date within the next 2 weeks to give you time to prepare.  · Write down the reasons why you are quitting. Keep this list in places where you will see it often.  · Tell your family, friends, and co-workers that you are quitting. Their support is important.  · Talk with your doctor about the choices that may help you quit.  · Find out if your health insurance will pay for these treatments.  · Know the people, places, things, and activities that make you want to smoke (triggers). Avoid them.  What first steps can I take to quit smoking?  · Throw away all cigarettes at home, at work, and in your car.  · Throw away the things that you use when you smoke, such as ashtrays and lighters.  · Clean your car. Make sure to empty the ashtray.  · Clean your home, including curtains and carpets.  What can I do to help me quit smoking?  Talk with your doctor about taking medicines and seeing a counselor at the same time. You are more likely to succeed when you do both.  · If you are pregnant or breastfeeding, talk with your doctor about counseling or other ways to quit smoking. Do not take medicine to help you quit smoking unless your doctor tells you to do so.  To quit smoking:  Quit right away  · Quit smoking totally, instead of slowly cutting back on how much you smoke over a period of time.  · Go to counseling. You are more likely to quit if you go to counseling sessions regularly.  Take medicine  You may take medicines to help you quit. Some  medicines need a prescription, and some you can buy over-the-counter. Some medicines may contain a drug called nicotine to replace the nicotine in cigarettes. Medicines may:  · Help you to stop having the desire to smoke (cravings).  · Help to stop the problems that come when you stop smoking (withdrawal symptoms).  Your doctor may ask you to use:  · Nicotine patches, gum, or lozenges.  · Nicotine inhalers or sprays.  · Non-nicotine medicine that is taken by mouth.  Find resources  Find resources and other ways to help you quit smoking and remain smoke-free after you quit. These resources are most helpful when you use them often. They include:  · Online chats with a counselor.  · Phone quitlines.  · Printed self-help materials.  · Support groups or group counseling.  · Text messaging programs.  · Mobile phone apps. Use apps on your mobile phone or tablet that can help you stick to your quit plan. There are many free apps for mobile phones and tablets as well as websites. Examples include Quit Guide from the CDC and smokefree.gov    What things can I do to make it easier to quit?    · Talk to your family and friends. Ask them to support and encourage you.  · Call a phone quitline (8-065-QUITNOW), reach out to support groups, or work with a counselor.  · Ask people who smoke to not smoke around you.  · Avoid places that make you want to smoke, such as:  ? Bars.  ? Parties.  ? Smoke-break areas at work.  · Spend time with people who do not smoke.  · Lower the stress in your life. Stress can make you want to smoke. Try these things to help your stress:  ? Getting regular exercise.  ? Doing deep-breathing exercises.  ? Doing yoga.  ? Meditating.  ? Doing a body scan. To do this, close your eyes, focus on one area of your body at a time from head to toe. Notice which parts of your body are tense. Try to relax the muscles in those areas.  How will I feel when I quit smoking?  Day 1 to 3 weeks  Within the first 24 hours,  you may start to have some problems that come from quitting tobacco. These problems are very bad 2-3 days after you quit, but they do not often last for more than 2-3 weeks. You may get these symptoms:  · Mood swings.  · Feeling restless, nervous, angry, or annoyed.  · Trouble concentrating.  · Dizziness.  · Strong desire for high-sugar foods and nicotine.  · Weight gain.  · Trouble pooping (constipation).  · Feeling like you may vomit (nausea).  · Coughing or a sore throat.  · Changes in how the medicines that you take for other issues work in your body.  · Depression.  · Trouble sleeping (insomnia).  Week 3 and afterward  After the first 2-3 weeks of quitting, you may start to notice more positive results, such as:  · Better sense of smell and taste.  · Less coughing and sore throat.  · Slower heart rate.  · Lower blood pressure.  · Clearer skin.  · Better breathing.  · Fewer sick days.  Quitting smoking can be hard. Do not give up if you fail the first time. Some people need to try a few times before they succeed. Do your best to stick to your quit plan, and talk with your doctor if you have any questions or concerns.  Summary  · Smoking tobacco is the leading cause of preventable death. Quitting smoking can be hard, but it is one of the best things that you can do for your health.  · When you decide to quit smoking, make a plan to help you succeed.  · Quit smoking right away, not slowly over a period of time.  · When you start quitting, seek help from your doctor, family, or friends.  This information is not intended to replace advice given to you by your health care provider. Make sure you discuss any questions you have with your health care provider.  Document Revised: 09/11/2020 Document Reviewed: 03/07/2020  Elsevier Patient Education © 2021 [employer]

## 2022-01-11 ENCOUNTER — HOSPITAL ENCOUNTER (OUTPATIENT)
Dept: CT IMAGING | Facility: HOSPITAL | Age: 72
Discharge: HOME | End: 2022-01-11
Admitting: PHYSICIAN ASSISTANT

## 2022-01-11 DIAGNOSIS — Z72.0 TOBACCO ABUSE: ICD-10-CM

## 2022-01-11 DIAGNOSIS — J44.9 COPD, SEVERE: ICD-10-CM

## 2022-01-11 DIAGNOSIS — R91.1 SOLITARY PULMONARY NODULE: ICD-10-CM

## 2022-01-11 PROCEDURE — 71250 CT THORAX DX C-: CPT

## 2022-01-20 ENCOUNTER — TELEPHONE (OUTPATIENT)
Dept: INTERNAL MEDICINE | Facility: CLINIC | Age: 72
End: 2022-01-20

## 2022-01-20 NOTE — TELEPHONE ENCOUNTER
Reviewed CT. Patient wanted to know if there is any further action needed. Patient states the symptoms are the same

## 2022-01-20 NOTE — TELEPHONE ENCOUNTER
[facility] WAS UNABLE TO WARM TRANSFER     Caller: Queenie Pike    Relationship: Self    Best call back number: [phone]   What is the best time to reach you: ANYTIME     Who are you requesting to speak with (clinical staff, provider,  specific staff member): CLINICAL         What was the call regarding: PATIENT IS CALLING REGARDING LAB RESULTS, AND RETURNING CALL BACK     Do you require a callback: YES

## 2022-01-21 NOTE — TELEPHONE ENCOUNTER
Discussed pt with Dr. Felton, please schedule her an apt with Jose Francisco to discuss on an upcoming tues am apt or any other opening avaliable.

## 2022-01-27 ENCOUNTER — TELEPHONE (OUTPATIENT)
Dept: INTERNAL MEDICINE | Facility: CLINIC | Age: 72
End: 2022-01-27

## 2022-01-27 NOTE — TELEPHONE ENCOUNTER
Caller: ShahzadQueenie    Relationship: Self    Best call back number: [phone]    What is the best time to reach you: ANYTIME     Who are you requesting to speak with (clinical staff, provider,  specific staff member): CLINICAL    Do you know the name of the person who called:     What was the call regarding:     PATIENT IS REQUESTING A CALL FROM PCP LASHON TODAY 1/27/22. SHE HAD A SCHEDULE APPOINTMENT TODAY AND CANCELLED IT . SHE HAS SOME THINGS SHE WANTS TO DISCUSS         Do you require a callback:     YES

## 2022-01-27 NOTE — TELEPHONE ENCOUNTER
Pt doesn't want to come into office. Its hard to get around walking. She doesn't have my chart. Will have her son help set it up so she can do a video visit

## 2022-01-27 NOTE — TELEPHONE ENCOUNTER
Please have a nurse call and triage her issues.  If she needs to have a discussion with a provider she probably needs an appointment.

## 2022-03-18 RX ORDER — HYDROCHLOROTHIAZIDE 12.5 MG/1
TABLET ORAL
Qty: 30 TABLET | Refills: 3 | Status: SHIPPED | OUTPATIENT
Start: 2022-03-18 | End: 2022-08-15

## 2022-08-04 DIAGNOSIS — I10 ESSENTIAL HYPERTENSION: Primary | ICD-10-CM

## 2022-08-04 RX ORDER — HYDROCHLOROTHIAZIDE 12.5 MG/1
TABLET ORAL
Qty: 30 TABLET | Refills: 3 | OUTPATIENT
Start: 2022-08-04

## 2022-08-04 RX ORDER — LOSARTAN POTASSIUM 50 MG/1
TABLET ORAL
Qty: 30 TABLET | Refills: 0 | Status: SHIPPED | OUTPATIENT
Start: 2022-08-04 | End: 2022-08-15 | Stop reason: SDUPTHER

## 2022-08-04 NOTE — TELEPHONE ENCOUNTER
Patient needs to be called and notified that she needs to get lab work and have a appointment prior to any further refills.  The order for the lab work has been placed.

## 2022-08-05 ENCOUNTER — TRANSCRIBE ORDERS (OUTPATIENT)
Dept: VASCULAR SURGERY | Facility: HOSPITAL | Age: 72
End: 2022-08-05

## 2022-08-05 DIAGNOSIS — I70.213 ATHEROSCLEROSIS OF NATIVE ARTERY OF BOTH LOWER EXTREMITIES WITH INTERMITTENT CLAUDICATION: Primary | ICD-10-CM

## 2022-08-11 ENCOUNTER — OFFICE VISIT (OUTPATIENT)
Dept: INTERNAL MEDICINE | Facility: CLINIC | Age: 72
End: 2022-08-11

## 2022-08-11 VITALS
RESPIRATION RATE: 15 BRPM | TEMPERATURE: 97.4 F | HEIGHT: 59 IN | OXYGEN SATURATION: 98 % | SYSTOLIC BLOOD PRESSURE: 138 MMHG | HEART RATE: 84 BPM | BODY MASS INDEX: 24.19 KG/M2 | WEIGHT: 120 LBS | DIASTOLIC BLOOD PRESSURE: 70 MMHG

## 2022-08-11 DIAGNOSIS — E78.2 MIXED HYPERLIPIDEMIA: ICD-10-CM

## 2022-08-11 DIAGNOSIS — J44.9 COPD, SEVERE: ICD-10-CM

## 2022-08-11 DIAGNOSIS — Z12.31 ENCOUNTER FOR SCREENING MAMMOGRAM FOR BREAST CANCER: ICD-10-CM

## 2022-08-11 DIAGNOSIS — Z12.11 SCREEN FOR COLON CANCER: ICD-10-CM

## 2022-08-11 DIAGNOSIS — I10 ESSENTIAL HYPERTENSION: ICD-10-CM

## 2022-08-11 DIAGNOSIS — R60.9 EDEMA, UNSPECIFIED TYPE: ICD-10-CM

## 2022-08-11 DIAGNOSIS — Z00.00 MEDICARE ANNUAL WELLNESS VISIT, SUBSEQUENT: Primary | ICD-10-CM

## 2022-08-11 LAB
ALBUMIN SERPL-MCNC: 4.3 G/DL (ref 3.5–5.2)
ALBUMIN/GLOB SERPL: 1.6 G/DL
ALP SERPL-CCNC: 64 U/L (ref 39–117)
ALT SERPL W P-5'-P-CCNC: 22 U/L (ref 1–33)
ANION GAP SERPL CALCULATED.3IONS-SCNC: 13.7 MMOL/L (ref 5–15)
ANISOCYTOSIS BLD QL: ABNORMAL
AST SERPL-CCNC: 30 U/L (ref 1–32)
BILIRUB SERPL-MCNC: 0.7 MG/DL (ref 0–1.2)
BUN SERPL-MCNC: 11 MG/DL (ref 8–23)
BUN/CREAT SERPL: 11.7 (ref 7–25)
CALCIUM SPEC-SCNC: 10 MG/DL (ref 8.6–10.5)
CHLORIDE SERPL-SCNC: 85 MMOL/L (ref 98–107)
CHOLEST SERPL-MCNC: 259 MG/DL (ref 0–200)
CO2 SERPL-SCNC: 27.3 MMOL/L (ref 22–29)
CREAT SERPL-MCNC: 0.94 MG/DL (ref 0.57–1)
DEPRECATED RDW RBC AUTO: 49.7 FL (ref 37–54)
EGFRCR SERPLBLD CKD-EPI 2021: 65 ML/MIN/1.73
EOSINOPHIL # BLD MANUAL: 0.06 10*3/MM3 (ref 0–0.4)
EOSINOPHIL NFR BLD MANUAL: 1.1 % (ref 0.3–6.2)
ERYTHROCYTE [DISTWIDTH] IN BLOOD BY AUTOMATED COUNT: 13 % (ref 12.3–15.4)
GLOBULIN UR ELPH-MCNC: 2.7 GM/DL
GLUCOSE SERPL-MCNC: 96 MG/DL (ref 65–99)
HCT VFR BLD AUTO: 39.7 % (ref 34–46.6)
HDLC SERPL-MCNC: 144 MG/DL (ref 40–60)
HGB BLD-MCNC: 14.2 G/DL (ref 12–15.9)
LDLC SERPL CALC-MCNC: 105 MG/DL (ref 0–100)
LDLC/HDLC SERPL: 0.71 {RATIO}
LYMPHOCYTES # BLD MANUAL: 1.44 10*3/MM3 (ref 0.7–3.1)
LYMPHOCYTES NFR BLD MANUAL: 15.8 % (ref 5–12)
MACROCYTES BLD QL SMEAR: ABNORMAL
MCH RBC QN AUTO: 37.8 PG (ref 26.6–33)
MCHC RBC AUTO-ENTMCNC: 35.8 G/DL (ref 31.5–35.7)
MCV RBC AUTO: 105.6 FL (ref 79–97)
MONOCYTES # BLD: 0.8 10*3/MM3 (ref 0.1–0.9)
NEUTROPHILS # BLD AUTO: 2.78 10*3/MM3 (ref 1.7–7)
NEUTROPHILS NFR BLD MANUAL: 54.7 % (ref 42.7–76)
PLAT MORPH BLD: NORMAL
PLATELET # BLD AUTO: 273 10*3/MM3 (ref 140–450)
PMV BLD AUTO: 11.8 FL (ref 6–12)
POIKILOCYTOSIS BLD QL SMEAR: ABNORMAL
POTASSIUM SERPL-SCNC: 4 MMOL/L (ref 3.5–5.2)
PROT SERPL-MCNC: 7 G/DL (ref 6–8.5)
RBC # BLD AUTO: 3.76 10*6/MM3 (ref 3.77–5.28)
SMUDGE CELLS BLD QL SMEAR: ABNORMAL
SODIUM SERPL-SCNC: 126 MMOL/L (ref 136–145)
TRIGL SERPL-MCNC: 65 MG/DL (ref 0–150)
TSH SERPL DL<=0.05 MIU/L-ACNC: 3.61 UIU/ML (ref 0.27–4.2)
VARIANT LYMPHS NFR BLD MANUAL: 28.4 % (ref 19.6–45.3)
VLDLC SERPL-MCNC: 10 MG/DL (ref 5–40)
WBC NRBC COR # BLD: 5.08 10*3/MM3 (ref 3.4–10.8)

## 2022-08-11 PROCEDURE — 85025 COMPLETE CBC W/AUTO DIFF WBC: CPT

## 2022-08-11 PROCEDURE — 80061 LIPID PANEL: CPT

## 2022-08-11 PROCEDURE — 1159F MED LIST DOCD IN RCRD: CPT | Performed by: PHYSICIAN ASSISTANT

## 2022-08-11 PROCEDURE — 99213 OFFICE O/P EST LOW 20 MIN: CPT | Performed by: PHYSICIAN ASSISTANT

## 2022-08-11 PROCEDURE — 85007 BL SMEAR W/DIFF WBC COUNT: CPT

## 2022-08-11 PROCEDURE — 1170F FXNL STATUS ASSESSED: CPT | Performed by: PHYSICIAN ASSISTANT

## 2022-08-11 PROCEDURE — G0439 PPPS, SUBSEQ VISIT: HCPCS | Performed by: PHYSICIAN ASSISTANT

## 2022-08-11 PROCEDURE — 80053 COMPREHEN METABOLIC PANEL: CPT

## 2022-08-11 PROCEDURE — 84443 ASSAY THYROID STIM HORMONE: CPT

## 2022-08-11 NOTE — PROGRESS NOTES
The ABCs of the Annual Wellness Visit  Initial Medicare Wellness Visit    Chief Complaint   Patient presents with   • Edema     Subjective   History of Present Illness:  Queenie Pike is a 72 y.o. female who presents for an Initial Medicare Wellness Visit.    The following portions of the patient's history were reviewed and   updated as appropriate: allergies, current medications, past family history, past medical history, past social history, past surgical history and problem list.     Compared to one year ago, the patient feels her physical   health is the same.    Compared to one year ago, the patient feels her mental   health is the same.    Recent Hospitalizations:  She was not admitted to the hospital during the last year.       Current Medical Providers:  Patient Care Team:  Ailin Felton MD as PCP - General (Internal Medicine)  Edward Cobb MD as Consulting Physician (Urology)    Outpatient Medications Prior to Visit   Medication Sig Dispense Refill   • albuterol sulfate HFA (Proventil HFA) 108 (90 Base) MCG/ACT inhaler      • Cyanocobalamin (B-12) 1000 MCG tablet      • Diclofenac Sodium (Voltaren) 1 % gel gel Apply 4 g topically to the appropriate area as directed 4 (Four) Times a Day As Needed (hand pain). 100 g 1   • hydroCHLOROthiazide (HYDRODIURIL) 12.5 MG tablet TAKE 1 TABLET BY MOUTH ONCE DAILY 30 tablet 3   • losartan (COZAAR) 50 MG tablet TAKE 1 TABLET BY MOUTH EVERY DAY 30 tablet 0   • Omega-3 Fatty Acids (fish oil) 1000 MG capsule capsule Fish Oil 1,000 mg (120 mg-180 mg) oral capsule take 1 capsule by oral route daily   Active     • aspirin 81 MG EC tablet aspirin 81 mg oral tablet,delayed release (DR/EC) take 1 tablet (81 mg) by oral route once daily   Active     • furosemide (Lasix) 20 MG tablet Take 1 tablet by mouth Daily. 30 tablet 0     No facility-administered medications prior to visit.       No opioid medication identified on active medication list. I have reviewed chart  "for other potential  high risk medication/s and harmful drug interactions in the elderly.          Aspirin is on active medication list. Aspirin use is not indicated based on review of current medical condition/s. Risk of harm outweighs potential benefits. Patient instructed to discontinue this medication.  .      Patient Active Problem List   Diagnosis   • Allergic rhinitis   • COPD, severe (HCC)   • Anxiety   • Hyperlipemia   • Tobacco abuse   • Essential hypertension   • History of bladder cancer   • Nephrolithiasis   • Anemia   • Bladder cancer (HCC)   • Head injury   • Jaundice   • Macrocytosis   • Pulmonary embolus (HCC)   • Solitary pulmonary nodule   • Medicare annual wellness visit, subsequent     Advance Care Planning  Advance Directive is not on file.  ACP discussion was held with the patient during this visit. Patient does not have an advance directive, declines further assistance.          Objective       Vitals:    08/11/22 0955   BP: 138/70   Pulse: 84   Resp: 15   Temp: 97.4 °F (36.3 °C)   SpO2: 98%   Weight: 54.4 kg (120 lb)   Height: 149.9 cm (59\")     Estimated body mass index is 24.24 kg/m² as calculated from the following:    Height as of this encounter: 149.9 cm (59\").    Weight as of this encounter: 54.4 kg (120 lb).    BMI is within normal parameters. No other follow-up for BMI required.      Does the patient have evidence of cognitive impairment? No    Physical Exam          HEALTH RISK ASSESSMENT    Smoking Status:  Social History     Tobacco Use   Smoking Status Current Every Day Smoker   • Packs/day: 1.00   • Years: 40.00   • Pack years: 40.00   Smokeless Tobacco Never Used   Tobacco Comment    1 PK QD - HAS SMOKED OVER 20 YEARS      Alcohol Consumption:  Social History     Substance and Sexual Activity   Alcohol Use Yes    Comment: 7 BEVERAGES WEEKLY      Fall Risk Screen:    STEADI Fall Risk Assessment was completed, and patient is at LOW risk for falls.Assessment completed " on:8/11/2022    Depression Screen:   PHQ-2/PHQ-9 Depression Screening 8/11/2022   Retired PHQ-9 Total Score -   Retired Total Score -   Little Interest or Pleasure in Doing Things 0-->not at all   Feeling Down, Depressed or Hopeless 0-->not at all   PHQ-9: Brief Depression Severity Measure Score 0       Health Habits and Functional and Cognitive Screening:  Functional & Cognitive Status 8/11/2022   Do you have difficulty preparing food and eating? No   Do you have difficulty bathing yourself, getting dressed or grooming yourself? No   Do you have difficulty using the toilet? No   Do you have difficulty moving around from place to place? No   Do you have trouble with steps or getting out of a bed or a chair? No   Current Diet Limited Junk Food   Dental Exam Not up to date   Eye Exam Not up to date   Exercise (times per week) 0 times per week   Current Exercises Include No Regular Exercise   Do you need help using the phone?  No   Are you deaf or do you have serious difficulty hearing?  No   Do you need help with transportation? No   Do you need help shopping? No   Do you need help preparing meals?  No   Do you need help with housework?  No   Do you need help with laundry? No   Do you need help taking your medications? No   Do you need help managing money? No   Do you ever drive or ride in a car without wearing a seat belt? No   Have you felt unusual stress, anger or loneliness in the last month? No   Who do you live with? Spouse   If you need help, do you have trouble finding someone available to you? No   Have you been bothered in the last four weeks by sexual problems? No   Do you have difficulty concentrating, remembering or making decisions? No       Age-appropriate Screening Schedule:  Refer to the list below for future screening recommendations based on patient's age, sex and/or medical conditions. Orders for these recommended tests are listed in the plan section. The patient has been provided with a written  plan.    Health Maintenance   Topic Date Due   • DXA SCAN  Never done   • TDAP/TD VACCINES (1 - Tdap) Never done   • ZOSTER VACCINE (1 of 2) Never done   • MAMMOGRAM  01/29/2017   • INFLUENZA VACCINE  10/01/2022   • LIPID PANEL  12/16/2022            Assessment & Plan   CMS Preventative Services Quick Reference  Risk Factors Identified During Encounter  Tobacco Use/Dependance (use dotphrase .tobaccocessation for documentation)  The above risks/problems have been discussed with the patient.  Follow up actions/plans if indicated are seen below in the Assessment/Plan Section.  Pertinent information has been shared with the patient in the After Visit Summary.    Diagnoses and all orders for this visit:    1. Medicare annual wellness visit, subsequent (Primary)  Assessment & Plan:  Reviewed preventative medication recommendations that are age appropriate for the patient. Education provided for health and wellness. Encouraged healthy diet, regular exercise, and routine wellness checkups.  Ordered mammogram and colonoscopy referral.   Pt declined pneumonia vaccine  Pt declined advanced directive.      2. COPD, severe (HCC)  Assessment & Plan:  COPD is improving with treatment.  Continue current medications.          3. Screen for colon cancer  -     Ambulatory Referral For Screening Colonoscopy    4. Encounter for screening mammogram for breast cancer  -     Mammo Screening Digital Tomosynthesis Bilateral With CAD; Future    5. Essential hypertension  Assessment & Plan:  Discussed blood pressure elevation at today's visit. Risks of blood pressure elevation include death, heart attack, stroke, kidney disease, blindness. Low salt diet, increase exercise. Discussed importance of medication compliance and not missing doses. Continue current medications at this time. We will monitor at follow up and adjust medications if still elevated. To er if chest pain, palpitations, vision loss, unilateral weakness, altered mental status.  Pt understands and agrees with plan.        6. Mixed hyperlipidemia  Assessment & Plan:  Labs today      7. Edema, unspecified type  Comments:  No edema on exam. Reviewed normal vascular note and SHADY. Discussed smoking cessation with help with blood flow. Encouraged compression socks      Follow Up:  Return in about 3 months (around 11/11/2022) for with Dr. Felton.     An After Visit Summary and PPPS were made available to the patient.

## 2022-08-11 NOTE — PATIENT INSTRUCTIONS
Steps to Quit Smoking  Smoking tobacco is the leading cause of preventable death. It can affect almost every organ in the body. Smoking puts you and people around you at risk for many serious, long-lasting (chronic) diseases. Quitting smoking can be hard, but it is one of the best things that you can do for your health. It is never too late to quit.  How do I get ready to quit?  When you decide to quit smoking, make a plan to help you succeed. Before you quit:  Pick a date to quit. Set a date within the next 2 weeks to give you time to prepare.  Write down the reasons why you are quitting. Keep this list in places where you will see it often.  Tell your family, friends, and co-workers that you are quitting. Their support is important.  Talk with your doctor about the choices that may help you quit.  Find out if your health insurance will pay for these treatments.  Know the people, places, things, and activities that make you want to smoke (triggers). Avoid them.  What first steps can I take to quit smoking?  Throw away all cigarettes at home, at work, and in your car.  Throw away the things that you use when you smoke, such as ashtrays and lighters.  Clean your car. Make sure to empty the ashtray.  Clean your home, including curtains and carpets.  What can I do to help me quit smoking?  Talk with your doctor about taking medicines and seeing a counselor at the same time. You are more likely to succeed when you do both.  If you are pregnant or breastfeeding, talk with your doctor about counseling or other ways to quit smoking. Do not take medicine to help you quit smoking unless your doctor tells you to do so.  To quit smoking:  Quit right away  Quit smoking totally, instead of slowly cutting back on how much you smoke over a period of time.  Go to counseling. You are more likely to quit if you go to counseling sessions regularly.  Take medicine  You may take medicines to help you quit. Some medicines need a  prescription, and some you can buy over-the-counter. Some medicines may contain a drug called nicotine to replace the nicotine in cigarettes. Medicines may:  Help you to stop having the desire to smoke (cravings).  Help to stop the problems that come when you stop smoking (withdrawal symptoms).  Your doctor may ask you to use:  Nicotine patches, gum, or lozenges.  Nicotine inhalers or sprays.  Non-nicotine medicine that is taken by mouth.  Find resources  Find resources and other ways to help you quit smoking and remain smoke-free after you quit. These resources are most helpful when you use them often. They include:  Online chats with a counselor.  Phone quitlines.  Printed self-help materials.  Support groups or group counseling.  Text messaging programs.  Mobile phone apps. Use apps on your mobile phone or tablet that can help you stick to your quit plan. There are many free apps for mobile phones and tablets as well as websites. Examples include Quit Guide from the CDC and smokefree.gov    What things can I do to make it easier to quit?    Talk to your family and friends. Ask them to support and encourage you.  Call a phone quitline (1-090-QUITNOW), reach out to support groups, or work with a counselor.  Ask people who smoke to not smoke around you.  Avoid places that make you want to smoke, such as:  Bars.  Parties.  Smoke-break areas at work.  Spend time with people who do not smoke.  Lower the stress in your life. Stress can make you want to smoke. Try these things to help your stress:  Getting regular exercise.  Doing deep-breathing exercises.  Doing yoga.  Meditating.  Doing a body scan. To do this, close your eyes, focus on one area of your body at a time from head to toe. Notice which parts of your body are tense. Try to relax the muscles in those areas.  How will I feel when I quit smoking?  Day 1 to 3 weeks  Within the first 24 hours, you may start to have some problems that come from quitting tobacco.  These problems are very bad 2-3 days after you quit, but they do not often last for more than 2-3 weeks. You may get these symptoms:  Mood swings.  Feeling restless, nervous, angry, or annoyed.  Trouble concentrating.  Dizziness.  Strong desire for high-sugar foods and nicotine.  Weight gain.  Trouble pooping (constipation).  Feeling like you may vomit (nausea).  Coughing or a sore throat.  Changes in how the medicines that you take for other issues work in your body.  Depression.  Trouble sleeping (insomnia).  Week 3 and afterward  After the first 2-3 weeks of quitting, you may start to notice more positive results, such as:  Better sense of smell and taste.  Less coughing and sore throat.  Slower heart rate.  Lower blood pressure.  Clearer skin.  Better breathing.  Fewer sick days.  Quitting smoking can be hard. Do not give up if you fail the first time. Some people need to try a few times before they succeed. Do your best to stick to your quit plan, and talk with your doctor if you have any questions or concerns.  Summary  Smoking tobacco is the leading cause of preventable death. Quitting smoking can be hard, but it is one of the best things that you can do for your health.  When you decide to quit smoking, make a plan to help you succeed.  Quit smoking right away, not slowly over a period of time.  When you start quitting, seek help from your doctor, family, or friends.  This information is not intended to replace advice given to you by your health care provider. Make sure you discuss any questions you have with your health care provider.  Document Revised: 09/11/2020 Document Reviewed: 03/07/2020  [employer] Patient Education © 2021 [employer]

## 2022-08-11 NOTE — PROGRESS NOTES
Chief Complaint  Edema    Subjective          Queenie Pike presents to [facility]  Pt still having swelling in bilateral legs  She did find a pair of compression socks which she is able to get on so she plans to start wearing those  She is still smoking 1PPD. She is trying to cut back but does not want to quit   Denies chest pain, palpitations, dizziness, syncope, sob.   She has seen vascular and SHADY normal    COPD: has not needed albuterol recently  Denies resp distress      Past Medical History:   Diagnosis Date   • Anemia    • Anxiety    • AR (allergic rhinitis)    • Bladder cancer (HCC) 2009   • Bladder disorder    • Broken bones 1987   • COPD, severe (HCC) 03/13/2015    SHE REFUSES INHALERS OTHER THAN ALBUTEROL, BUT GOT HER TO TAKE SYMBICORT SAMPLE AND TOLD HER WOULD PRESCRIBE IT IF SHE LIKES IT ALSO TOLD HER WE WOULD DO NOCTURNAL OXYGEN TESTING IF SHE IS INTERESTED, HOWEVER STATES SHE DOESNT WANT TO AT THIS TIME    • Depression    • Head injury 1987   • Hyperlipemia    • Jaundice 1987   • Macrocytosis 01/12/2016   • Pulmonary embolus (HCC) 12/15/2015    DOING WELL OFF OF MEDICATION. CONTINUE TO MONITOR. WARNED HER PERSONAL RISK ESPECIALLY IF SHE CONTINUES TO SMOKE HOWEVER SHE REALLY WANTS TO BE OFF OF MEDICINES AND UNDERSTANDS THE RISKS. DOING WELL OFF OF MEDICATION.    • Pulmonary nodule 12/22/2014    BILATERAL PULMONARY NODULE/LESION  WILL REPEAT CT SCAN IN MARCH    • Tobacco abuse 12/22/2014    SHE ISNT INTERESTED IN TOBACCO CESSATION AT THIS TIME         Past Surgical History:   Procedure Laterality Date   • BLADDER SURGERY  2009   • CYSTOSCOPY  08/13/2019    CYSTOSCOPY AND URETEROSCOPY WITH LASER LITHOTRIPSY   • HYSTERECTOMY  1989   • SALPINGO OOPHORECTOMY Bilateral 01/2015    FOR REMOVAL OF LARGE CYSTADENOMAS (LARGEST 22CM)   • TUMOR REMOVAL      OVARIES   • URETERAL STENT INSERTION  08/13/2019        Current Outpatient Medications on File Prior to  "Visit   Medication Sig Dispense Refill   • albuterol sulfate HFA (Proventil HFA) 108 (90 Base) MCG/ACT inhaler      • Cyanocobalamin (B-12) 1000 MCG tablet      • Diclofenac Sodium (Voltaren) 1 % gel gel Apply 4 g topically to the appropriate area as directed 4 (Four) Times a Day As Needed (hand pain). 100 g 1   • hydroCHLOROthiazide (HYDRODIURIL) 12.5 MG tablet TAKE 1 TABLET BY MOUTH ONCE DAILY 30 tablet 3   • losartan (COZAAR) 50 MG tablet TAKE 1 TABLET BY MOUTH EVERY DAY 30 tablet 0   • Omega-3 Fatty Acids (fish oil) 1000 MG capsule capsule Fish Oil 1,000 mg (120 mg-180 mg) oral capsule take 1 capsule by oral route daily   Active     • aspirin 81 MG EC tablet aspirin 81 mg oral tablet,delayed release (DR/EC) take 1 tablet (81 mg) by oral route once daily   Active     • furosemide (Lasix) 20 MG tablet Take 1 tablet by mouth Daily. 30 tablet 0     No current facility-administered medications on file prior to visit.        Allergies   Allergen Reactions   • Penicillins Anaphylaxis   • Sulfa Antibiotics Anaphylaxis       Social History     Tobacco Use   Smoking Status Current Every Day Smoker   • Packs/day: 1.00   • Years: 40.00   • Pack years: 40.00   Smokeless Tobacco Never Used   Tobacco Comment    1 PK QD - HAS SMOKED OVER 20 YEARS           Objective   Vital Signs:   /70   Pulse 84   Temp 97.4 °F (36.3 °C)   Resp 15   Ht 149.9 cm (59\")   Wt 54.4 kg (120 lb)   SpO2 98%   BMI 24.24 kg/m²     Physical Exam  Vitals reviewed.   Constitutional:       Appearance: Normal appearance.   HENT:      Head: Normocephalic and atraumatic.      Nose: Nose normal.      Mouth/Throat:      Mouth: Mucous membranes are moist.   Eyes:      Extraocular Movements: Extraocular movements intact.      Conjunctiva/sclera: Conjunctivae normal.      Pupils: Pupils are equal, round, and reactive to light.   Cardiovascular:      Rate and Rhythm: Normal rate and regular rhythm.   Pulmonary:      Effort: Pulmonary effort is normal. "      Breath sounds: Normal breath sounds.   Abdominal:      General: Abdomen is flat. Bowel sounds are normal.      Palpations: Abdomen is soft.   Musculoskeletal:         General: Normal range of motion.   Neurological:      General: No focal deficit present.      Mental Status: She is alert and oriented to person, place, and time.   Psychiatric:         Mood and Affect: Mood normal.        Result Review :                 Assessment and Plan    Diagnoses and all orders for this visit:    1. COPD, severe (HCC) (Primary)  Assessment & Plan:  COPD is improving with treatment.  Continue current medications.          2. Screen for colon cancer  -     Ambulatory Referral For Screening Colonoscopy    3. Encounter for screening mammogram for breast cancer  -     Mammo Screening Digital Tomosynthesis Bilateral With CAD; Future    4. Essential hypertension  Assessment & Plan:  Discussed blood pressure elevation at today's visit. Risks of blood pressure elevation include death, heart attack, stroke, kidney disease, blindness. Low salt diet, increase exercise. Discussed importance of medication compliance and not missing doses. Continue current medications at this time. We will monitor at follow up and adjust medications if still elevated. To er if chest pain, palpitations, vision loss, unilateral weakness, altered mental status. Pt understands and agrees with plan.        5. Mixed hyperlipidemia  Assessment & Plan:  Labs today        Follow Up {Instructions Charge Capture  Follow-up Communications :23}  Return in about 3 months (around 11/11/2022) for with Dr. Felton.  Patient was given instructions and counseling regarding her condition or for health maintenance advice. Please see specific information pulled into the AVS if appropriate.

## 2022-08-11 NOTE — ASSESSMENT & PLAN NOTE
Reviewed preventative medication recommendations that are age appropriate for the patient. Education provided for health and wellness. Encouraged healthy diet, regular exercise, and routine wellness checkups.  Ordered mammogram and colonoscopy referral.   Pt declined pneumonia vaccine  Pt declined advanced directive.

## 2022-08-12 ENCOUNTER — CLINICAL SUPPORT (OUTPATIENT)
Dept: INTERNAL MEDICINE | Facility: CLINIC | Age: 72
End: 2022-08-12

## 2022-08-12 DIAGNOSIS — E87.1 HYPONATREMIA: ICD-10-CM

## 2022-08-12 DIAGNOSIS — E87.1 HYPONATREMIA: Primary | ICD-10-CM

## 2022-08-12 LAB
ANION GAP SERPL CALCULATED.3IONS-SCNC: 11.8 MMOL/L (ref 5–15)
BUN SERPL-MCNC: 10 MG/DL (ref 8–23)
BUN/CREAT SERPL: 10.3 (ref 7–25)
CALCIUM SPEC-SCNC: 10.1 MG/DL (ref 8.6–10.5)
CHLORIDE SERPL-SCNC: 86 MMOL/L (ref 98–107)
CO2 SERPL-SCNC: 28.2 MMOL/L (ref 22–29)
CREAT SERPL-MCNC: 0.97 MG/DL (ref 0.57–1)
EGFRCR SERPLBLD CKD-EPI 2021: 62.6 ML/MIN/1.73
GLUCOSE SERPL-MCNC: 121 MG/DL (ref 65–99)
POTASSIUM SERPL-SCNC: 4 MMOL/L (ref 3.5–5.2)
SODIUM SERPL-SCNC: 126 MMOL/L (ref 136–145)

## 2022-08-12 PROCEDURE — 80048 BASIC METABOLIC PNL TOTAL CA: CPT | Performed by: PHYSICIAN ASSISTANT

## 2022-08-12 PROCEDURE — 36415 COLL VENOUS BLD VENIPUNCTURE: CPT | Performed by: INTERNAL MEDICINE

## 2022-08-12 RX ORDER — ATORVASTATIN CALCIUM 10 MG/1
10 TABLET, FILM COATED ORAL DAILY
Qty: 90 TABLET | Refills: 1 | Status: SHIPPED | OUTPATIENT
Start: 2022-08-12 | End: 2023-03-22

## 2022-08-13 NOTE — PROGRESS NOTES
I have reviewed the notes, assessments, and/or procedures performed by Coty Donovan PA-C, I concur with her documentation of Queenie Pike.

## 2022-08-15 ENCOUNTER — CLINICAL SUPPORT (OUTPATIENT)
Dept: INTERNAL MEDICINE | Facility: CLINIC | Age: 72
End: 2022-08-15

## 2022-08-15 ENCOUNTER — TELEPHONE (OUTPATIENT)
Dept: INTERNAL MEDICINE | Facility: CLINIC | Age: 72
End: 2022-08-15

## 2022-08-15 DIAGNOSIS — E87.1 HYPONATREMIA: Primary | ICD-10-CM

## 2022-08-15 PROCEDURE — 36415 COLL VENOUS BLD VENIPUNCTURE: CPT | Performed by: PHYSICIAN ASSISTANT

## 2022-08-15 PROCEDURE — 80048 BASIC METABOLIC PNL TOTAL CA: CPT | Performed by: PHYSICIAN ASSISTANT

## 2022-08-15 RX ORDER — LOSARTAN POTASSIUM 100 MG/1
50 TABLET ORAL DAILY
Qty: 30 TABLET | Refills: 1 | Status: SHIPPED | OUTPATIENT
Start: 2022-08-15 | End: 2023-02-13

## 2022-08-15 NOTE — TELEPHONE ENCOUNTER
Caller: Shahzad Queenie [place]    Relationship: Self    Best call back number: [phone]    Who are you requesting to speak with (clinical staff, provider,  specific staff member): MEDICAL STAFF    What was the call regarding: PATIENT SPOKE WITH SOMEONE YESTERDAY REGARDING HER SODIUM LEVEL. SHE WAS TOLD IS WAS AN [facility] L[facility]EL. SHE WAS TOLD TO EAT SOME SALT AND CALL THE OFFICE. SHE WAS ALSO TOLD ABOUT A POSSIBLE IV. ATTEMPTED TO WARM TRANSFER PATIENT. PLEASE CALL PATIENT TO ADVISE ON THE NEXT STEP.

## 2022-08-16 LAB
ANION GAP SERPL CALCULATED.3IONS-SCNC: 15 MMOL/L (ref 5–15)
BUN SERPL-MCNC: 12 MG/DL (ref 8–23)
BUN/CREAT SERPL: 13.8 (ref 7–25)
CALCIUM SPEC-SCNC: 9.8 MG/DL (ref 8.6–10.5)
CHLORIDE SERPL-SCNC: 86 MMOL/L (ref 98–107)
CO2 SERPL-SCNC: 25 MMOL/L (ref 22–29)
CREAT SERPL-MCNC: 0.87 MG/DL (ref 0.57–1)
EGFRCR SERPLBLD CKD-EPI 2021: 71.3 ML/MIN/1.73
GLUCOSE SERPL-MCNC: 120 MG/DL (ref 65–99)
POTASSIUM SERPL-SCNC: 4 MMOL/L (ref 3.5–5.2)
SODIUM SERPL-SCNC: 126 MMOL/L (ref 136–145)

## 2022-08-17 DIAGNOSIS — E87.1 HYPONATREMIA: Primary | ICD-10-CM

## 2022-08-18 NOTE — TELEPHONE ENCOUNTER
Patient was advised to come into office for walk-in lab re-draw.  Labs have already been re-drawn and patient spoken with.

## 2022-08-22 ENCOUNTER — CLINICAL SUPPORT (OUTPATIENT)
Dept: INTERNAL MEDICINE | Facility: CLINIC | Age: 72
End: 2022-08-22

## 2022-08-22 DIAGNOSIS — E87.1 HYPONATREMIA: ICD-10-CM

## 2022-08-22 LAB
ANION GAP SERPL CALCULATED.3IONS-SCNC: 13 MMOL/L (ref 5–15)
BUN SERPL-MCNC: 8 MG/DL (ref 8–23)
BUN/CREAT SERPL: 9.5 (ref 7–25)
CALCIUM SPEC-SCNC: 9.6 MG/DL (ref 8.6–10.5)
CHLORIDE SERPL-SCNC: 92 MMOL/L (ref 98–107)
CO2 SERPL-SCNC: 26 MMOL/L (ref 22–29)
CREAT SERPL-MCNC: 0.84 MG/DL (ref 0.57–1)
EGFRCR SERPLBLD CKD-EPI 2021: 74.4 ML/MIN/1.73
GLUCOSE SERPL-MCNC: 79 MG/DL (ref 65–99)
POTASSIUM SERPL-SCNC: 4.5 MMOL/L (ref 3.5–5.2)
SODIUM SERPL-SCNC: 131 MMOL/L (ref 136–145)

## 2022-08-22 PROCEDURE — 36415 COLL VENOUS BLD VENIPUNCTURE: CPT | Performed by: INTERNAL MEDICINE

## 2022-08-22 PROCEDURE — 80048 BASIC METABOLIC PNL TOTAL CA: CPT | Performed by: PHYSICIAN ASSISTANT

## 2022-08-23 DIAGNOSIS — E87.1 HYPONATREMIA: Primary | ICD-10-CM

## 2022-09-07 ENCOUNTER — APPOINTMENT (OUTPATIENT)
Dept: CARDIOLOGY | Facility: HOSPITAL | Age: 72
End: 2022-09-07

## 2023-02-09 ENCOUNTER — TELEPHONE (OUTPATIENT)
Dept: UROLOGY | Facility: CLINIC | Age: 73
End: 2023-02-09
Payer: MEDICARE

## 2023-02-09 NOTE — TELEPHONE ENCOUNTER
----- Message from Kerri Elizabeth sent at 2/9/2023  8:30 AM EST -----  Patient was supposed to follow up 10/2022 with KUB, urinalysis with micro prior. Can you get her an appt next available, and have her do these prior please

## 2023-02-13 RX ORDER — LOSARTAN POTASSIUM 100 MG/1
TABLET ORAL
Qty: 45 TABLET | Refills: 0 | Status: SHIPPED | OUTPATIENT
Start: 2023-02-13

## 2023-03-22 RX ORDER — ATORVASTATIN CALCIUM 10 MG/1
TABLET, FILM COATED ORAL
Qty: 90 TABLET | Refills: 1 | Status: SHIPPED | OUTPATIENT
Start: 2023-03-22

## 2023-10-20 NOTE — TELEPHONE ENCOUNTER
Name from pharmacy: losartan 100 mg tablet         Will file in chart as: losartan (COZAAR) 100 MG tablet    Sig: TAKE 1/2 TABLET BY MOUTH ONCE DAILY    Disp: 45 tablet    Refills: 0    Start: 10/20/2023    Class: Normal    Non-formulary    Last ordered: 3 months ago (7/6/2023) by Ailin Felton MD    Last refill: 9/21/2023    Rx #: [id number]    ARB Protocol Carwxt39/20/2023 03:08 PM   Protocol Details Normal serum potassium on file within the past year    BP under 140/90 in past year    Normal serum creatinine on file within the past year    Visit with authorizing provider in past 12 months or upcoming 30 days    No active pregnancy on record    No positive pregnancy test in past 12 months    Patient is not on Entresto    Patient is not on concurrent ACE

## 2023-10-22 RX ORDER — ATORVASTATIN CALCIUM 10 MG/1
TABLET, FILM COATED ORAL
Qty: 90 TABLET | Refills: 1 | Status: SHIPPED | OUTPATIENT
Start: 2023-10-22

## 2023-10-22 RX ORDER — LOSARTAN POTASSIUM 100 MG/1
TABLET ORAL
Qty: 45 TABLET | Refills: 0 | Status: SHIPPED | OUTPATIENT
Start: 2023-10-22

## 2024-01-22 ENCOUNTER — OFFICE VISIT (OUTPATIENT)
Dept: INTERNAL MEDICINE | Facility: CLINIC | Age: 74
End: 2024-01-22
Payer: MEDICARE

## 2024-01-22 VITALS
HEART RATE: 70 BPM | TEMPERATURE: 98 F | WEIGHT: 117 LBS | SYSTOLIC BLOOD PRESSURE: 150 MMHG | OXYGEN SATURATION: 99 % | DIASTOLIC BLOOD PRESSURE: 70 MMHG | HEIGHT: 59 IN | RESPIRATION RATE: 18 BRPM | BODY MASS INDEX: 23.59 KG/M2

## 2024-01-22 DIAGNOSIS — Z72.0 TOBACCO ABUSE: ICD-10-CM

## 2024-01-22 DIAGNOSIS — E78.2 MIXED HYPERLIPIDEMIA: ICD-10-CM

## 2024-01-22 DIAGNOSIS — E55.9 VITAMIN D DEFICIENCY, UNSPECIFIED: ICD-10-CM

## 2024-01-22 DIAGNOSIS — Z87.891 PERSONAL HISTORY OF TOBACCO USE: ICD-10-CM

## 2024-01-22 DIAGNOSIS — I10 ESSENTIAL HYPERTENSION: Primary | ICD-10-CM

## 2024-01-22 DIAGNOSIS — Z12.2 SCREENING FOR LUNG CANCER: ICD-10-CM

## 2024-01-22 DIAGNOSIS — R79.9 ABNORMAL FINDING OF BLOOD CHEMISTRY, UNSPECIFIED: ICD-10-CM

## 2024-01-22 LAB
25(OH)D3 SERPL-MCNC: 14.9 NG/ML (ref 30–100)
ALBUMIN SERPL-MCNC: 4 G/DL (ref 3.5–5.2)
ALBUMIN/GLOB SERPL: 1.5 G/DL
ALP SERPL-CCNC: 77 U/L (ref 39–117)
ALT SERPL W P-5'-P-CCNC: 16 U/L (ref 1–33)
ANION GAP SERPL CALCULATED.3IONS-SCNC: 11 MMOL/L (ref 5–15)
AST SERPL-CCNC: 24 U/L (ref 1–32)
BASOPHILS # BLD AUTO: 0.04 10*3/MM3 (ref 0–0.2)
BASOPHILS NFR BLD AUTO: 0.9 % (ref 0–1.5)
BILIRUB SERPL-MCNC: 0.6 MG/DL (ref 0–1.2)
BUN SERPL-MCNC: 12 MG/DL (ref 8–23)
BUN/CREAT SERPL: 12.6 (ref 7–25)
CALCIUM SPEC-SCNC: 9.8 MG/DL (ref 8.6–10.5)
CHLORIDE SERPL-SCNC: 99 MMOL/L (ref 98–107)
CHOLEST SERPL-MCNC: 210 MG/DL (ref 0–200)
CO2 SERPL-SCNC: 26 MMOL/L (ref 22–29)
CREAT SERPL-MCNC: 0.95 MG/DL (ref 0.57–1)
DEPRECATED RDW RBC AUTO: 48.8 FL (ref 37–54)
EGFRCR SERPLBLD CKD-EPI 2021: 63.8 ML/MIN/1.73
EOSINOPHIL # BLD AUTO: 0.22 10*3/MM3 (ref 0–0.4)
EOSINOPHIL NFR BLD AUTO: 4.7 % (ref 0.3–6.2)
ERYTHROCYTE [DISTWIDTH] IN BLOOD BY AUTOMATED COUNT: 13 % (ref 12.3–15.4)
FOLATE SERPL-MCNC: 3.05 NG/ML (ref 4.78–24.2)
GLOBULIN UR ELPH-MCNC: 2.6 GM/DL
GLUCOSE SERPL-MCNC: 86 MG/DL (ref 65–99)
HCT VFR BLD AUTO: 33.4 % (ref 34–46.6)
HDLC SERPL-MCNC: 120 MG/DL (ref 40–60)
HGB BLD-MCNC: 11.8 G/DL (ref 12–15.9)
IMM GRANULOCYTES # BLD AUTO: 0.01 10*3/MM3 (ref 0–0.05)
IMM GRANULOCYTES NFR BLD AUTO: 0.2 % (ref 0–0.5)
IRON 24H UR-MRATE: 103 MCG/DL (ref 37–145)
IRON SATN MFR SERPL: 37 % (ref 20–50)
LDLC SERPL CALC-MCNC: 78 MG/DL (ref 0–100)
LDLC/HDLC SERPL: 0.63 {RATIO}
LYMPHOCYTES # BLD AUTO: 1.64 10*3/MM3 (ref 0.7–3.1)
LYMPHOCYTES NFR BLD AUTO: 35.3 % (ref 19.6–45.3)
MAGNESIUM SERPL-MCNC: 1.3 MG/DL (ref 1.6–2.4)
MCH RBC QN AUTO: 36.6 PG (ref 26.6–33)
MCHC RBC AUTO-ENTMCNC: 35.3 G/DL (ref 31.5–35.7)
MCV RBC AUTO: 103.7 FL (ref 79–97)
MONOCYTES # BLD AUTO: 0.47 10*3/MM3 (ref 0.1–0.9)
MONOCYTES NFR BLD AUTO: 10.1 % (ref 5–12)
NEUTROPHILS NFR BLD AUTO: 2.26 10*3/MM3 (ref 1.7–7)
NEUTROPHILS NFR BLD AUTO: 48.8 % (ref 42.7–76)
NRBC BLD AUTO-RTO: 0 /100 WBC (ref 0–0.2)
PLATELET # BLD AUTO: 241 10*3/MM3 (ref 140–450)
PMV BLD AUTO: 12.1 FL (ref 6–12)
POTASSIUM SERPL-SCNC: 5.6 MMOL/L (ref 3.5–5.2)
PROT SERPL-MCNC: 6.6 G/DL (ref 6–8.5)
RBC # BLD AUTO: 3.22 10*6/MM3 (ref 3.77–5.28)
SODIUM SERPL-SCNC: 136 MMOL/L (ref 136–145)
T4 FREE SERPL-MCNC: 1.16 NG/DL (ref 0.93–1.7)
TIBC SERPL-MCNC: 277 MCG/DL (ref 298–536)
TRANSFERRIN SERPL-MCNC: 186 MG/DL (ref 200–360)
TRIGL SERPL-MCNC: 70 MG/DL (ref 0–150)
TSH SERPL DL<=0.05 MIU/L-ACNC: 1.87 UIU/ML (ref 0.27–4.2)
VIT B12 BLD-MCNC: 456 PG/ML (ref 211–946)
VLDLC SERPL-MCNC: 12 MG/DL (ref 5–40)
WBC NRBC COR # BLD AUTO: 4.64 10*3/MM3 (ref 3.4–10.8)

## 2024-01-22 PROCEDURE — 84439 ASSAY OF FREE THYROXINE: CPT | Performed by: INTERNAL MEDICINE

## 2024-01-22 PROCEDURE — 84466 ASSAY OF TRANSFERRIN: CPT | Performed by: INTERNAL MEDICINE

## 2024-01-22 PROCEDURE — 83735 ASSAY OF MAGNESIUM: CPT | Performed by: INTERNAL MEDICINE

## 2024-01-22 PROCEDURE — 85025 COMPLETE CBC W/AUTO DIFF WBC: CPT | Performed by: INTERNAL MEDICINE

## 2024-01-22 PROCEDURE — 1160F RVW MEDS BY RX/DR IN RCRD: CPT | Performed by: INTERNAL MEDICINE

## 2024-01-22 PROCEDURE — 80061 LIPID PANEL: CPT | Performed by: INTERNAL MEDICINE

## 2024-01-22 PROCEDURE — 82746 ASSAY OF FOLIC ACID SERUM: CPT | Performed by: INTERNAL MEDICINE

## 2024-01-22 PROCEDURE — 3077F SYST BP >= 140 MM HG: CPT | Performed by: INTERNAL MEDICINE

## 2024-01-22 PROCEDURE — 1159F MED LIST DOCD IN RCRD: CPT | Performed by: INTERNAL MEDICINE

## 2024-01-22 PROCEDURE — 82607 VITAMIN B-12: CPT | Performed by: INTERNAL MEDICINE

## 2024-01-22 PROCEDURE — 99214 OFFICE O/P EST MOD 30 MIN: CPT | Performed by: INTERNAL MEDICINE

## 2024-01-22 PROCEDURE — 82306 VITAMIN D 25 HYDROXY: CPT | Performed by: INTERNAL MEDICINE

## 2024-01-22 PROCEDURE — 3078F DIAST BP <80 MM HG: CPT | Performed by: INTERNAL MEDICINE

## 2024-01-22 PROCEDURE — 84443 ASSAY THYROID STIM HORMONE: CPT | Performed by: INTERNAL MEDICINE

## 2024-01-22 PROCEDURE — 80053 COMPREHEN METABOLIC PANEL: CPT | Performed by: INTERNAL MEDICINE

## 2024-01-22 PROCEDURE — 83540 ASSAY OF IRON: CPT | Performed by: INTERNAL MEDICINE

## 2024-01-22 PROCEDURE — 36415 COLL VENOUS BLD VENIPUNCTURE: CPT | Performed by: INTERNAL MEDICINE

## 2024-01-22 RX ORDER — LOSARTAN POTASSIUM 100 MG/1
100 TABLET ORAL DAILY
Qty: 90 TABLET | Refills: 1 | Status: SHIPPED | OUTPATIENT
Start: 2024-01-22

## 2024-01-22 NOTE — PROGRESS NOTES
"Chief Complaint  Med Refill    Subjective      History of Present Illness  Queenie Pike is a 73 y.o. female who presents to [facility] with a past medical history of  Past Medical History:   Diagnosis Date    Anemia     Anxiety     AR (allergic rhinitis)     Bladder cancer 2009    Bladder disorder     Broken bones 1987    COPD, severe 03/13/2015    SHE REFUSES INHALERS OTHER THAN ALBUTEROL, BUT GOT HER TO TAKE SYMBICORT SAMPLE AND TOLD HER WOULD PRESCRIBE IT IF SHE LIKES IT ALSO TOLD HER WE WOULD DO NOCTURNAL OXYGEN TESTING IF SHE IS INTERESTED, HOWEVER STATES SHE DOESNT WANT TO AT THIS TIME     Depression     Head injury 1987    Hyperlipemia     Jaundice 1987    Macrocytosis 01/12/2016    Pulmonary embolus 12/15/2015    DOING WELL OFF OF MEDICATION. CONTINUE TO MONITOR. WARNED HER PERSONAL RISK ESPECIALLY IF SHE CONTINUES TO SMOKE HOWEVER SHE REALLY WANTS TO BE OFF OF MEDICINES AND UNDERSTANDS THE RISKS. DOING WELL OFF OF MEDICATION.     Pulmonary nodule 12/22/2014    BILATERAL PULMONARY NODULE/LESION  WILL REPEAT CT SCAN IN MARCH     Tobacco abuse 12/22/2014    SHE ISNT INTERESTED IN TOBACCO CESSATION AT THIS TIME      Fell out of bed and hurt her knee  Needed to use a walker for a few weeks  Better now  A little achy every so often    Breathing well currently    States that she is no longer having leg pain  She is still having some swelling of her feet bilateraly      Objective   Vital Signs:   Vitals:    01/22/24 1155   BP: 150/70   BP Location: Right arm   Patient Position: Sitting   Cuff Size: Adult   Pulse: 70   Resp: 18   Temp: 98 °F (36.7 °C)   TempSrc: Temporal   SpO2: 99%   Weight: 53.1 kg (117 lb)   Height: 149.9 cm (59\")     Body mass index is 23.63 kg/m².    Wt Readings from Last 3 Encounters:   01/22/24 53.1 kg (117 lb)   08/11/22 54.4 kg (120 lb)   12/16/21 57.6 kg (127 lb)     BP Readings from Last 3 Encounters:   01/22/24 150/70   08/11/22 " 138/70   12/16/21 150/78       Health Maintenance   Topic Date Due    DXA SCAN  Never done    COLORECTAL CANCER SCREENING  Never done    Pneumococcal Vaccine 65+ (1 of 2 - PCV) Never done    TDAP/TD VACCINES (1 - Tdap) Never done    ZOSTER VACCINE (1 of 2) Never done    MAMMOGRAM  01/29/2017    HEPATITIS C SCREENING  Never done    LUNG CANCER SCREENING  01/11/2023    INFLUENZA VACCINE  Never done    ANNUAL WELLNESS VISIT  08/11/2023    LIPID PANEL  08/11/2023    COVID-19 Vaccine (4 - 2023-24 season) 09/01/2023       Physical Exam  Vitals reviewed.   Constitutional:       Appearance: Normal appearance. She is well-developed.   HENT:      Head: Normocephalic and atraumatic.      Right Ear: External ear normal.      Left Ear: External ear normal.   Eyes:      Conjunctiva/sclera: Conjunctivae normal.      Pupils: Pupils are equal, round, and reactive to light.   Cardiovascular:      Rate and Rhythm: Normal rate and regular rhythm.      Heart sounds: No murmur heard.     No friction rub. No gallop.      Comments: Very slight swelling in her feet  Pulmonary:      Effort: Pulmonary effort is normal.      Breath sounds: Wheezing present. No rhonchi.      Comments: Scattered wheezes for now  Skin:     General: Skin is warm and dry.   Neurological:      Mental Status: She is alert and oriented to person, place, and time.   Psychiatric:         Mood and Affect: Affect normal.         Behavior: Behavior normal.         Thought Content: Thought content normal.            Result Review :  The following data was reviewed by: Ailin Felton MD on 01/22/2024:      Procedures        Assessment and Plan   Diagnoses and all orders for this visit:    1. Essential hypertension (Primary)  Comments:  elevated today, but she will montior at home; will give full cozaar, recommended taking full pill if still elevated  Orders:  -     Comprehensive Metabolic Panel  -     CBC & Differential  -     TSH  -     Lipid Panel  -     Magnesium  -      Vitamin D,25-Hydroxy  -     Vitamin B12 & Folate  -     Iron Profile  -     T4, Free  -      CT Chest Low Dose Cancer Screening WO; Future    2. Screening for lung cancer  -      CT Chest Low Dose Cancer Screening WO; Future    3. Personal history of tobacco use  Comments:  will order screening CT scan  Orders:  -      CT Chest Low Dose Cancer Screening WO; Future    4. Mixed hyperlipidemia  Comments:  will check labs and adjust as needed  Orders:  -     Comprehensive Metabolic Panel  -     CBC & Differential  -     TSH  -     Lipid Panel  -     Magnesium  -     Vitamin D,25-Hydroxy  -     Vitamin B12 & Folate  -     Iron Profile  -     T4, Free  -      CT Chest Low Dose Cancer Screening WO; Future    5. Tobacco abuse  -     Comprehensive Metabolic Panel  -     CBC & Differential  -     TSH  -     Lipid Panel  -     Magnesium  -     Vitamin D,25-Hydroxy  -     Vitamin B12 & Folate  -     Iron Profile  -     T4, Free  -      CT Chest Low Dose Cancer Screening WO; Future    6. Vitamin D deficiency, unspecified  Comments:  check labs and adjust labs as needed  Orders:  -     Vitamin D,25-Hydroxy    7. Abnormal finding of blood chemistry, unspecified  -     Iron Profile    Other orders  -     losartan (Cozaar) 100 MG tablet; Take 1 tablet by mouth Daily.  Dispense: 90 tablet; Refill: 1        BMI is within normal parameters. No other follow-up for BMI required.         FOLLOW UP  Return in about 4 months (around 5/22/2024).  Patient was given instructions and counseling regarding her condition or for health maintenance advice. Please see specific information pulled into the AVS if appropriate.       Ailin Felton MD  01/22/24  14:42 EST    CURRENT & DISCONTINUED MEDICATIONS  Current Outpatient Medications   Medication Instructions    albuterol sulfate HFA (Proventil HFA) 108 (90 Base) MCG/ACT inhaler No dose, route, or frequency recorded.    Cyanocobalamin (B-12) 1000 MCG tablet No dose, route, or frequency  recorded.    furosemide (LASIX) 20 mg, Oral, Daily    losartan (COZAAR) 100 mg, Oral, Daily    Omega-3 Fatty Acids (fish oil) 1000 MG capsule capsule Fish Oil 1,000 mg (120 mg-180 mg) oral capsule take 1 capsule by oral route daily   Active       Medications Discontinued During This Encounter   Medication Reason    atorvastatin (LIPITOR) 10 MG tablet     Diclofenac Sodium (Voltaren) 1 % gel gel     aspirin 81 MG EC tablet     losartan (COZAAR) 100 MG tablet

## 2024-01-23 RX ORDER — ERGOCALCIFEROL 1.25 MG/1
50000 CAPSULE ORAL WEEKLY
Qty: 12 CAPSULE | Refills: 0 | Status: SHIPPED | OUTPATIENT
Start: 2024-01-23

## 2024-01-25 ENCOUNTER — TELEPHONE (OUTPATIENT)
Dept: INTERNAL MEDICINE | Facility: CLINIC | Age: 74
End: 2024-01-25
Payer: MEDICARE

## 2024-01-25 NOTE — TELEPHONE ENCOUNTER
DELETE AFTER REVIEWING: Telephone encounter to be sent to the clinical pool     Hub staff attempted to follow warm transfer process and was unsuccessful     Caller: Queenie Pike    Relationship to patient: Self    Best call back number: [phone]    Patient is needing:PLEASE CALL AND DISCUSS VITAMIN D MEDICATION AND WHY SHE IS ON IT.

## 2024-02-07 ENCOUNTER — HOSPITAL ENCOUNTER (OUTPATIENT)
Dept: CT IMAGING | Facility: HOSPITAL | Age: 74
Discharge: HOME | End: 2024-02-07
Admitting: INTERNAL MEDICINE
Payer: MEDICARE

## 2024-02-07 DIAGNOSIS — E78.2 MIXED HYPERLIPIDEMIA: ICD-10-CM

## 2024-02-07 DIAGNOSIS — I10 ESSENTIAL HYPERTENSION: ICD-10-CM

## 2024-02-07 DIAGNOSIS — Z72.0 TOBACCO ABUSE: ICD-10-CM

## 2024-02-07 DIAGNOSIS — Z12.2 SCREENING FOR LUNG CANCER: ICD-10-CM

## 2024-02-07 DIAGNOSIS — Z87.891 PERSONAL HISTORY OF TOBACCO USE: ICD-10-CM

## 2024-02-07 PROCEDURE — 71271 CT THORAX LUNG CANCER SCR C-: CPT

## 2024-05-22 ENCOUNTER — OFFICE VISIT (OUTPATIENT)
Dept: INTERNAL MEDICINE | Facility: CLINIC | Age: 74
End: 2024-05-22
Payer: MEDICARE

## 2024-05-22 VITALS
SYSTOLIC BLOOD PRESSURE: 138 MMHG | OXYGEN SATURATION: 96 % | TEMPERATURE: 98.6 F | RESPIRATION RATE: 20 BRPM | HEART RATE: 66 BPM | WEIGHT: 116.2 LBS | HEIGHT: 59 IN | BODY MASS INDEX: 23.43 KG/M2 | DIASTOLIC BLOOD PRESSURE: 70 MMHG

## 2024-05-22 DIAGNOSIS — E61.1 IRON DEFICIENCY: ICD-10-CM

## 2024-05-22 DIAGNOSIS — E78.2 MIXED HYPERLIPIDEMIA: Primary | ICD-10-CM

## 2024-05-22 DIAGNOSIS — Z23 NEED FOR VACCINATION: ICD-10-CM

## 2024-05-22 DIAGNOSIS — E55.9 VITAMIN D DEFICIENCY: ICD-10-CM

## 2024-05-22 DIAGNOSIS — E53.8 FOLATE DEFICIENCY: ICD-10-CM

## 2024-05-22 DIAGNOSIS — I10 ESSENTIAL HYPERTENSION: ICD-10-CM

## 2024-05-22 DIAGNOSIS — J44.9 COPD, SEVERE: ICD-10-CM

## 2024-05-22 LAB
25(OH)D3 SERPL-MCNC: 71.3 NG/ML (ref 30–100)
ALBUMIN SERPL-MCNC: 4.5 G/DL (ref 3.5–5.2)
ALBUMIN/GLOB SERPL: 1.8 G/DL
ALP SERPL-CCNC: 66 U/L (ref 39–117)
ALT SERPL W P-5'-P-CCNC: 15 U/L (ref 1–33)
ANION GAP SERPL CALCULATED.3IONS-SCNC: 15 MMOL/L (ref 5–15)
AST SERPL-CCNC: 26 U/L (ref 1–32)
BASOPHILS # BLD AUTO: 0.05 10*3/MM3 (ref 0–0.2)
BASOPHILS NFR BLD AUTO: 1.1 % (ref 0–1.5)
BILIRUB SERPL-MCNC: 0.6 MG/DL (ref 0–1.2)
BUN SERPL-MCNC: 9 MG/DL (ref 8–23)
BUN/CREAT SERPL: 14.3 (ref 7–25)
CALCIUM SPEC-SCNC: 9.6 MG/DL (ref 8.6–10.5)
CHLORIDE SERPL-SCNC: 95 MMOL/L (ref 98–107)
CO2 SERPL-SCNC: 26 MMOL/L (ref 22–29)
CREAT SERPL-MCNC: 0.63 MG/DL (ref 0.57–1)
DEPRECATED RDW RBC AUTO: 48.7 FL (ref 37–54)
EGFRCR SERPLBLD CKD-EPI 2021: 93.8 ML/MIN/1.73
EOSINOPHIL # BLD AUTO: 0.27 10*3/MM3 (ref 0–0.4)
EOSINOPHIL NFR BLD AUTO: 5.8 % (ref 0.3–6.2)
ERYTHROCYTE [DISTWIDTH] IN BLOOD BY AUTOMATED COUNT: 12.9 % (ref 12.3–15.4)
FOLATE SERPL-MCNC: 6.19 NG/ML (ref 4.78–24.2)
GLOBULIN UR ELPH-MCNC: 2.5 GM/DL
GLUCOSE SERPL-MCNC: 77 MG/DL (ref 65–99)
HCT VFR BLD AUTO: 35.7 % (ref 34–46.6)
HGB BLD-MCNC: 12 G/DL (ref 12–15.9)
IMM GRANULOCYTES # BLD AUTO: 0 10*3/MM3 (ref 0–0.05)
IMM GRANULOCYTES NFR BLD AUTO: 0 % (ref 0–0.5)
IRON 24H UR-MRATE: 98 MCG/DL (ref 37–145)
IRON SATN MFR SERPL: 32 % (ref 20–50)
LYMPHOCYTES # BLD AUTO: 1.6 10*3/MM3 (ref 0.7–3.1)
LYMPHOCYTES NFR BLD AUTO: 34.6 % (ref 19.6–45.3)
MCH RBC QN AUTO: 35.1 PG (ref 26.6–33)
MCHC RBC AUTO-ENTMCNC: 33.6 G/DL (ref 31.5–35.7)
MCV RBC AUTO: 104.4 FL (ref 79–97)
MONOCYTES # BLD AUTO: 0.48 10*3/MM3 (ref 0.1–0.9)
MONOCYTES NFR BLD AUTO: 10.4 % (ref 5–12)
NEUTROPHILS NFR BLD AUTO: 2.23 10*3/MM3 (ref 1.7–7)
NEUTROPHILS NFR BLD AUTO: 48.1 % (ref 42.7–76)
NRBC BLD AUTO-RTO: 0 /100 WBC (ref 0–0.2)
PLATELET # BLD AUTO: 218 10*3/MM3 (ref 140–450)
PMV BLD AUTO: 11.7 FL (ref 6–12)
POTASSIUM SERPL-SCNC: 4.6 MMOL/L (ref 3.5–5.2)
PROT SERPL-MCNC: 7 G/DL (ref 6–8.5)
RBC # BLD AUTO: 3.42 10*6/MM3 (ref 3.77–5.28)
SODIUM SERPL-SCNC: 136 MMOL/L (ref 136–145)
TIBC SERPL-MCNC: 302 MCG/DL (ref 298–536)
TRANSFERRIN SERPL-MCNC: 203 MG/DL (ref 200–360)
VIT B12 BLD-MCNC: 369 PG/ML (ref 211–946)
WBC NRBC COR # BLD AUTO: 4.63 10*3/MM3 (ref 3.4–10.8)

## 2024-05-22 PROCEDURE — G2211 COMPLEX E/M VISIT ADD ON: HCPCS | Performed by: INTERNAL MEDICINE

## 2024-05-22 PROCEDURE — 80053 COMPREHEN METABOLIC PANEL: CPT | Performed by: INTERNAL MEDICINE

## 2024-05-22 PROCEDURE — 3075F SYST BP GE 130 - 139MM HG: CPT | Performed by: INTERNAL MEDICINE

## 2024-05-22 PROCEDURE — 84466 ASSAY OF TRANSFERRIN: CPT | Performed by: INTERNAL MEDICINE

## 2024-05-22 PROCEDURE — 82607 VITAMIN B-12: CPT | Performed by: INTERNAL MEDICINE

## 2024-05-22 PROCEDURE — 82746 ASSAY OF FOLIC ACID SERUM: CPT | Performed by: INTERNAL MEDICINE

## 2024-05-22 PROCEDURE — 36415 COLL VENOUS BLD VENIPUNCTURE: CPT | Performed by: INTERNAL MEDICINE

## 2024-05-22 PROCEDURE — 1126F AMNT PAIN NOTED NONE PRSNT: CPT | Performed by: INTERNAL MEDICINE

## 2024-05-22 PROCEDURE — 82306 VITAMIN D 25 HYDROXY: CPT | Performed by: INTERNAL MEDICINE

## 2024-05-22 PROCEDURE — 83540 ASSAY OF IRON: CPT | Performed by: INTERNAL MEDICINE

## 2024-05-22 PROCEDURE — 3078F DIAST BP <80 MM HG: CPT | Performed by: INTERNAL MEDICINE

## 2024-05-22 PROCEDURE — 99214 OFFICE O/P EST MOD 30 MIN: CPT | Performed by: INTERNAL MEDICINE

## 2024-05-22 PROCEDURE — 85025 COMPLETE CBC W/AUTO DIFF WBC: CPT | Performed by: INTERNAL MEDICINE

## 2024-05-22 RX ORDER — LOSARTAN POTASSIUM AND HYDROCHLOROTHIAZIDE 12.5; 5 MG/1; MG/1
1 TABLET ORAL DAILY
Qty: 90 TABLET | Refills: 1 | Status: SHIPPED | OUTPATIENT
Start: 2024-05-22

## 2024-05-22 NOTE — PROGRESS NOTES
"Chief Complaint  Hypertension (4 month follow up ), Hyperlipidemia, and Vitamin D Deficiency      Subjective      History of Present Illness  The patient presents for evaluation of multiple medical concerns.    The patient discontinued her home blood pressure monitoring. She is currently on a regimen of Cozaar 50 mg.    The patient denies experiencing any respiratory distress or chest discomfort. She admits to an increased frequency of smoking, however, she expresses no interest in cessation at this time.    The patient reports persistent lower extremity edema, despite daily Lasix intake. Despite attempts to use compression stockings, she found them ineffective. She denies experiencing any leg pain. Her physical activity is limited during the winter months, with the exception of housework, and she anticipates a noticeable difference once the weather warms up.     The patient denies experiencing any abdominal pain.       She drinks 2 alcoholic beverages every night.           Objective   Vital Signs:   Vitals:    05/22/24 1123   BP: 138/70   BP Location: Right arm   Patient Position: Sitting   Cuff Size: Adult   Pulse: 66   Resp: 20   Temp: 98.6 °F (37 °C)   TempSrc: Temporal   SpO2: 96%   Weight: 52.7 kg (116 lb 3.2 oz)   Height: 149.9 cm (59.02\")     Body mass index is 23.46 kg/m².    Wt Readings from Last 3 Encounters:   05/22/24 52.7 kg (116 lb 3.2 oz)   01/22/24 53.1 kg (117 lb)   08/11/22 54.4 kg (120 lb)     BP Readings from Last 3 Encounters:   05/22/24 138/70   01/22/24 150/70   08/11/22 138/70       Health Maintenance   Topic Date Due    DXA SCAN  Never done    COLORECTAL CANCER SCREENING  Never done    Pneumococcal Vaccine 65+ (1 of 2 - PCV) Never done    TDAP/TD VACCINES (1 - Tdap) Never done    ZOSTER VACCINE (1 of 2) Never done    MAMMOGRAM  01/29/2017    HEPATITIS C SCREENING  Never done    ANNUAL WELLNESS VISIT  08/11/2023    RSV Vaccine - Adults (1 - 1-dose 60+ series) 05/23/2024 (Originally " 5/15/2011)    COVID-19 Vaccine (4 - 2023-24 season) 08/01/2024 (Originally 9/1/2023)    INFLUENZA VACCINE  08/01/2024    LIPID PANEL  01/22/2025    LUNG CANCER SCREENING  02/07/2025       Physical Exam  Vitals reviewed.   Constitutional:       Appearance: Normal appearance. She is well-developed.   HENT:      Head: Normocephalic and atraumatic.      Right Ear: External ear normal.      Left Ear: External ear normal.   Eyes:      Conjunctiva/sclera: Conjunctivae normal.      Pupils: Pupils are equal, round, and reactive to light.   Cardiovascular:      Rate and Rhythm: Normal rate and regular rhythm.      Heart sounds: No murmur heard.     No friction rub. No gallop.      Comments: Slight lower extremity edema bilaterally  Pulmonary:      Effort: Pulmonary effort is normal.      Breath sounds: Normal breath sounds. No wheezing or rhonchi.   Skin:     General: Skin is warm and dry.   Neurological:      Mental Status: She is alert and oriented to person, place, and time.   Psychiatric:         Mood and Affect: Affect normal.         Behavior: Behavior normal.         Thought Content: Thought content normal.        Physical Exam        Result Review :  The following data was reviewed by: Ailin Felton MD on 05/22/2024:         Results  Laboratory Studies  Magnesium was on the low side. Vitamin D level was really low. Folate was really low.    Imaging  CT scan of lungs showed one nodule that has not grown, a few stones in gallbladder, and some emphysema.    BMI is within normal parameters. No other follow-up for BMI required.       Procedures            Assessment & Plan  Need for vaccination    Mixed hyperlipidemia     Essential hypertension    COPD, severe      Iron deficiency    Folate deficiency    Vitamin D deficiency      Orders Placed This Encounter   Procedures    Comprehensive Metabolic Panel    Vitamin D,25-Hydroxy    Vitamin B12 & Folate    Iron Profile    CBC & Differential     New Medications Ordered  This Visit   Medications    losartan-hydrochlorothiazide (Hyzaar) 50-12.5 MG per tablet     Sig: Take 1 tablet by mouth Daily.     Dispense:  90 tablet     Refill:  1          Assessment & Plan  1. Hypertension.  The patient's blood pressure was noted to be marginally elevated during this visit.   Will add low-dose hydrochlorothiazide    2. Emphysema of the lungs.  The patient was strongly advised to reduce her smoking habit.    3. Lower extremity edema.  Will add 12.5 mg diuretic     4. Health maintenance.  The patient will receive her pneumonia vaccine today. Once she secures insurance coverage, a colonoscopy or Cologuard screening will be considered. Blood work will be conducted today. The patient was advised to incorporate a multivitamin into her regimen.    Patient or patient representative verbalized consent for the use of Ambient Listening during the visit with  Ailin Felton MD for chart documentation. 5/22/2024  12:10 EDT      FOLLOW UP  No follow-ups on file.  Patient was given instructions and counseling regarding her condition or for health maintenance advice. Please see specific information pulled into the AVS if appropriate.     Ailin Felton MD  05/22/24  12:15 EDT    CURRENT & DISCONTINUED MEDICATIONS  Current Outpatient Medications   Medication Instructions    albuterol sulfate HFA (Proventil HFA) 108 (90 Base) MCG/ACT inhaler No dose, route, or frequency recorded.    Cyanocobalamin (B-12) 1000 MCG tablet No dose, route, or frequency recorded.    losartan-hydrochlorothiazide (Hyzaar) 50-12.5 MG per tablet 1 tablet, Oral, Daily    Omega-3 Fatty Acids (fish oil) 1000 MG capsule capsule Fish Oil 1,000 mg (120 mg-180 mg) oral capsule take 1 capsule by oral route daily   Active    vitamin D (ERGOCALCIFEROL) 50,000 Units, Oral, Weekly       Medications Discontinued During This Encounter   Medication Reason    furosemide (Lasix) 20 MG tablet     losartan (Cozaar) 100 MG tablet

## 2024-05-24 RX ORDER — ATORVASTATIN CALCIUM 10 MG/1
TABLET, FILM COATED ORAL
Qty: 90 TABLET | Refills: 1 | OUTPATIENT
Start: 2024-05-24

## 2024-05-28 DIAGNOSIS — E53.8 VITAMIN B 12 DEFICIENCY: Primary | ICD-10-CM

## 2024-05-28 RX ORDER — CHOLECALCIFEROL (VITAMIN D3) 50 MCG
2000 TABLET ORAL DAILY
Qty: 90 TABLET | Refills: 1 | Status: SHIPPED | OUTPATIENT
Start: 2024-05-28

## 2024-05-28 RX ORDER — CYANOCOBALAMIN 1000 UG/ML
1000 INJECTION, SOLUTION INTRAMUSCULAR; SUBCUTANEOUS
Status: SHIPPED | OUTPATIENT
Start: 2024-05-28 | End: 2025-05-23

## 2024-05-31 ENCOUNTER — TELEPHONE (OUTPATIENT)
Dept: INTERNAL MEDICINE | Facility: CLINIC | Age: 74
End: 2024-05-31
Payer: MEDICARE

## 2024-05-31 NOTE — TELEPHONE ENCOUNTER
Caller: Queenie Pike    Relationship: Self    Best call back number: [phone]     What is the best time to reach you: ANYTIME    Who are you requesting to speak with (clinical staff, provider,  specific staff member): CLINICAL    What was the call regarding: PATIENT WOULD LIKE TO REVIEW HER CURRENT MEDICATIONS.    Is it okay if the provider responds through MyChart: CALL PLEASE

## 2024-05-31 NOTE — TELEPHONE ENCOUNTER
Hub staff attempted to follow warm transfer process and was unsuccessful     Caller: Queenie Pike    Relationship to patient: Self    Best call back number: [phone]     Patient is needing: PATIENT REACHING BACK OUT TO OFFICE TO DISCUSS MEDICATIONS. PATIENT NEEDING INFORMATION AS SOON AS POSSIBLE. PATIENT HAS GOTTEN CONFUSED BECAUSE OF RECENT MEDICATION CHANGES. PATIENT WANTING TO KNOW WHICH MEDICATIONS SHE SHOULD BE TAKING AND WHICH ONES TO GET RID OF. PLEASE ADVISE.

## 2024-06-03 NOTE — TELEPHONE ENCOUNTER
Called and spoke with pt, pt stated she was confused on what medications she should stop taking, pt was wondering if she should still continue with her fish oil, I explained to pt there was nothing in her last office note to stop the fish oil read to pt what I have on pt's active medication list, pt verbalized understanding and stated has no other questions at this time.

## 2024-09-25 ENCOUNTER — OFFICE VISIT (OUTPATIENT)
Dept: INTERNAL MEDICINE | Facility: CLINIC | Age: 74
End: 2024-09-25
Payer: MEDICARE

## 2024-09-25 VITALS
DIASTOLIC BLOOD PRESSURE: 62 MMHG | SYSTOLIC BLOOD PRESSURE: 142 MMHG | RESPIRATION RATE: 22 BRPM | BODY MASS INDEX: 22.86 KG/M2 | HEIGHT: 59 IN | OXYGEN SATURATION: 93 % | HEART RATE: 75 BPM | WEIGHT: 113.4 LBS | TEMPERATURE: 98.1 F

## 2024-09-25 DIAGNOSIS — M79.89 LEG SWELLING: ICD-10-CM

## 2024-09-25 DIAGNOSIS — E55.9 VITAMIN D DEFICIENCY: ICD-10-CM

## 2024-09-25 DIAGNOSIS — I10 ESSENTIAL HYPERTENSION: Primary | ICD-10-CM

## 2024-09-25 DIAGNOSIS — Z12.11 SCREENING FOR COLON CANCER: ICD-10-CM

## 2024-09-25 DIAGNOSIS — E53.8 VITAMIN B12 DEFICIENCY: ICD-10-CM

## 2024-09-25 DIAGNOSIS — Z00.00 ENCOUNTER FOR ANNUAL WELLNESS VISIT (AWV) IN MEDICARE PATIENT: ICD-10-CM

## 2024-09-25 DIAGNOSIS — E53.8 VITAMIN B 12 DEFICIENCY: ICD-10-CM

## 2024-09-25 DIAGNOSIS — K59.00 CONSTIPATION, UNSPECIFIED CONSTIPATION TYPE: ICD-10-CM

## 2024-09-25 DIAGNOSIS — E61.1 IRON DEFICIENCY: ICD-10-CM

## 2024-09-25 DIAGNOSIS — J44.9 COPD, SEVERE: ICD-10-CM

## 2024-09-25 DIAGNOSIS — E53.8 FOLATE DEFICIENCY: ICD-10-CM

## 2024-09-25 LAB
25(OH)D3 SERPL-MCNC: 151 NG/ML (ref 30–100)
ALBUMIN SERPL-MCNC: 3.9 G/DL (ref 3.5–5.2)
ALBUMIN/GLOB SERPL: 1.5 G/DL
ALP SERPL-CCNC: 71 U/L (ref 39–117)
ALT SERPL W P-5'-P-CCNC: 20 U/L (ref 1–33)
ANION GAP SERPL CALCULATED.3IONS-SCNC: 13.2 MMOL/L (ref 5–15)
ANISOCYTOSIS BLD QL: ABNORMAL
AST SERPL-CCNC: 31 U/L (ref 1–32)
BASOPHILS # BLD MANUAL: 0 10*3/MM3 (ref 0–0.2)
BASOPHILS NFR BLD MANUAL: 0 % (ref 0–1.5)
BILIRUB SERPL-MCNC: 1.3 MG/DL (ref 0–1.2)
BUN SERPL-MCNC: 7 MG/DL (ref 8–23)
BUN/CREAT SERPL: 10.4 (ref 7–25)
CALCIUM SPEC-SCNC: 9.7 MG/DL (ref 8.6–10.5)
CHLORIDE SERPL-SCNC: 98 MMOL/L (ref 98–107)
CHOLEST SERPL-MCNC: 228 MG/DL (ref 0–200)
CO2 SERPL-SCNC: 23.8 MMOL/L (ref 22–29)
CREAT SERPL-MCNC: 0.67 MG/DL (ref 0.57–1)
DEPRECATED RDW RBC AUTO: 46.6 FL (ref 37–54)
EGFRCR SERPLBLD CKD-EPI 2021: 92.4 ML/MIN/1.73
EOSINOPHIL # BLD MANUAL: 0.22 10*3/MM3 (ref 0–0.4)
EOSINOPHIL NFR BLD MANUAL: 4 % (ref 0.3–6.2)
ERYTHROCYTE [DISTWIDTH] IN BLOOD BY AUTOMATED COUNT: 12 % (ref 12.3–15.4)
FOLATE SERPL-MCNC: 5.05 NG/ML (ref 4.78–24.2)
GLOBULIN UR ELPH-MCNC: 2.6 GM/DL
GLUCOSE SERPL-MCNC: 69 MG/DL (ref 65–99)
HCT VFR BLD AUTO: 43.7 % (ref 34–46.6)
HDLC SERPL-MCNC: 119 MG/DL (ref 40–60)
HGB BLD-MCNC: 15.2 G/DL (ref 12–15.9)
LDLC SERPL CALC-MCNC: 96 MG/DL (ref 0–100)
LDLC/HDLC SERPL: 0.78 {RATIO}
LYMPHOCYTES # BLD MANUAL: 0.71 10*3/MM3 (ref 0.7–3.1)
LYMPHOCYTES NFR BLD MANUAL: 4 % (ref 5–12)
MACROCYTES BLD QL SMEAR: ABNORMAL
MCH RBC QN AUTO: 36.8 PG (ref 26.6–33)
MCHC RBC AUTO-ENTMCNC: 34.8 G/DL (ref 31.5–35.7)
MCV RBC AUTO: 105.8 FL (ref 79–97)
MONOCYTES # BLD: 0.22 10*3/MM3 (ref 0.1–0.9)
NEUTROPHILS # BLD AUTO: 4.26 10*3/MM3 (ref 1.7–7)
NEUTROPHILS NFR BLD MANUAL: 78.8 % (ref 42.7–76)
NRBC BLD AUTO-RTO: 0 /100 WBC (ref 0–0.2)
PLAT MORPH BLD: NORMAL
PLATELET # BLD AUTO: 252 10*3/MM3 (ref 140–450)
PMV BLD AUTO: 12 FL (ref 6–12)
POIKILOCYTOSIS BLD QL SMEAR: ABNORMAL
POLYCHROMASIA BLD QL SMEAR: ABNORMAL
POTASSIUM SERPL-SCNC: 3.8 MMOL/L (ref 3.5–5.2)
PROT SERPL-MCNC: 6.5 G/DL (ref 6–8.5)
RBC # BLD AUTO: 4.13 10*6/MM3 (ref 3.77–5.28)
SMUDGE CELLS BLD QL SMEAR: ABNORMAL
SODIUM SERPL-SCNC: 135 MMOL/L (ref 136–145)
TRIGL SERPL-MCNC: 78 MG/DL (ref 0–150)
TSH SERPL DL<=0.05 MIU/L-ACNC: 2.04 UIU/ML (ref 0.27–4.2)
VARIANT LYMPHS NFR BLD MANUAL: 13.1 % (ref 19.6–45.3)
VIT B12 BLD-MCNC: >2000 PG/ML (ref 211–946)
VLDLC SERPL-MCNC: 13 MG/DL (ref 5–40)
WBC NRBC COR # BLD AUTO: 5.4 10*3/MM3 (ref 3.4–10.8)

## 2024-09-25 PROCEDURE — 3077F SYST BP >= 140 MM HG: CPT | Performed by: INTERNAL MEDICINE

## 2024-09-25 PROCEDURE — G0439 PPPS, SUBSEQ VISIT: HCPCS | Performed by: INTERNAL MEDICINE

## 2024-09-25 PROCEDURE — 85007 BL SMEAR W/DIFF WBC COUNT: CPT | Performed by: INTERNAL MEDICINE

## 2024-09-25 PROCEDURE — 80061 LIPID PANEL: CPT | Performed by: INTERNAL MEDICINE

## 2024-09-25 PROCEDURE — 84443 ASSAY THYROID STIM HORMONE: CPT | Performed by: INTERNAL MEDICINE

## 2024-09-25 PROCEDURE — 1170F FXNL STATUS ASSESSED: CPT | Performed by: INTERNAL MEDICINE

## 2024-09-25 PROCEDURE — 99214 OFFICE O/P EST MOD 30 MIN: CPT | Performed by: INTERNAL MEDICINE

## 2024-09-25 PROCEDURE — 82607 VITAMIN B-12: CPT | Performed by: INTERNAL MEDICINE

## 2024-09-25 PROCEDURE — 82746 ASSAY OF FOLIC ACID SERUM: CPT | Performed by: INTERNAL MEDICINE

## 2024-09-25 PROCEDURE — 82306 VITAMIN D 25 HYDROXY: CPT | Performed by: INTERNAL MEDICINE

## 2024-09-25 PROCEDURE — 1126F AMNT PAIN NOTED NONE PRSNT: CPT | Performed by: INTERNAL MEDICINE

## 2024-09-25 PROCEDURE — 80053 COMPREHEN METABOLIC PANEL: CPT | Performed by: INTERNAL MEDICINE

## 2024-09-25 PROCEDURE — 96372 THER/PROPH/DIAG INJ SC/IM: CPT | Performed by: INTERNAL MEDICINE

## 2024-09-25 PROCEDURE — 85025 COMPLETE CBC W/AUTO DIFF WBC: CPT | Performed by: INTERNAL MEDICINE

## 2024-09-25 PROCEDURE — 3078F DIAST BP <80 MM HG: CPT | Performed by: INTERNAL MEDICINE

## 2024-09-25 PROCEDURE — 36415 COLL VENOUS BLD VENIPUNCTURE: CPT | Performed by: INTERNAL MEDICINE

## 2024-09-25 RX ORDER — LOSARTAN POTASSIUM AND HYDROCHLOROTHIAZIDE 25; 100 MG/1; MG/1
1 TABLET ORAL DAILY
Qty: 90 TABLET | Refills: 1 | Status: SHIPPED | OUTPATIENT
Start: 2024-09-25

## 2024-09-25 RX ORDER — AMOXICILLIN 250 MG
1 CAPSULE ORAL DAILY
Qty: 90 TABLET | Refills: 1 | Status: SHIPPED | OUTPATIENT
Start: 2024-09-25

## 2024-09-25 RX ORDER — CYANOCOBALAMIN 1000 UG/ML
1000 INJECTION, SOLUTION INTRAMUSCULAR; SUBCUTANEOUS
Status: SHIPPED | OUTPATIENT
Start: 2024-09-25 | End: 2025-09-20

## 2024-09-25 RX ADMIN — CYANOCOBALAMIN 1000 MCG: 1000 INJECTION, SOLUTION INTRAMUSCULAR; SUBCUTANEOUS at 10:13

## 2024-10-02 DIAGNOSIS — D75.89 MACROCYTOSIS: Primary | ICD-10-CM

## 2024-12-03 RX ORDER — CHOLECALCIFEROL (VITAMIN D3) 50 MCG
1 TABLET ORAL DAILY
Qty: 90 TABLET | Refills: 1 | Status: SHIPPED | OUTPATIENT
Start: 2024-12-03

## 2025-04-22 RX ORDER — LOSARTAN POTASSIUM AND HYDROCHLOROTHIAZIDE 25; 100 MG/1; MG/1
1 TABLET ORAL DAILY
Qty: 90 TABLET | Refills: 1 | Status: SHIPPED | OUTPATIENT
Start: 2025-04-22

## 2025-05-08 ENCOUNTER — TELEPHONE (OUTPATIENT)
Dept: INTERNAL MEDICINE | Facility: CLINIC | Age: 75
End: 2025-05-08
Payer: COMMERCIAL

## 2025-05-08 NOTE — TELEPHONE ENCOUNTER
Caller: [facility]    Relationship: Other    Best call back number: [phone]     What orders are you requesting (i.e. lab or imaging): LUNG CANCER SCREENING    In what timeframe would the patient need to come in: ANY TIME    Additional notes: CALLER STATES PATIENT HAD LAST LUNG CANCER SCREENING ON 02.07.2024 AND IS DUE FOR ANOTHER ONE. CALLER REQUESTING ORDER BE PLACED.

## 2025-05-13 NOTE — TELEPHONE ENCOUNTER
That is fine please call patient and if she is willing to have it let me know and I can order and/or cosign.

## 2025-05-13 NOTE — TELEPHONE ENCOUNTER
Called and spoke with pt, pt stated she just recently got new health insurance and wants to see if it is covered by her health insurance, pt stated she will call and update the office once she finds out more information.